# Patient Record
Sex: FEMALE | Race: WHITE | Employment: UNEMPLOYED | URBAN - METROPOLITAN AREA
[De-identification: names, ages, dates, MRNs, and addresses within clinical notes are randomized per-mention and may not be internally consistent; named-entity substitution may affect disease eponyms.]

---
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
## 2023-10-23 ENCOUNTER — HOSPITAL ENCOUNTER (EMERGENCY)
Facility: HOSPITAL | Age: 23
Discharge: HOME | End: 2023-10-24
Attending: FAMILY MEDICINE
Payer: MEDICAID

## 2023-10-23 ENCOUNTER — APPOINTMENT (OUTPATIENT)
Dept: RADIOLOGY | Facility: HOSPITAL | Age: 23
End: 2023-10-23
Payer: MEDICAID

## 2023-10-23 DIAGNOSIS — R06.02 SHORTNESS OF BREATH: ICD-10-CM

## 2023-10-23 DIAGNOSIS — R07.9 CHEST PAIN, UNSPECIFIED TYPE: Primary | ICD-10-CM

## 2023-10-23 DIAGNOSIS — B34.9 VIRAL INFECTION: ICD-10-CM

## 2023-10-23 DIAGNOSIS — R79.89 ELEVATED D-DIMER: ICD-10-CM

## 2023-10-23 DIAGNOSIS — R00.0 TACHYCARDIA: ICD-10-CM

## 2023-10-23 LAB
AMPHETAMINES UR QL SCN: NORMAL
BARBITURATES UR QL SCN: NORMAL
BASOPHILS # BLD AUTO: 0.02 X10*3/UL (ref 0–0.1)
BASOPHILS NFR BLD AUTO: 0.2 %
BENZODIAZ UR QL SCN: NORMAL
BZE UR QL SCN: NORMAL
CANNABINOIDS UR QL SCN: NORMAL
CARDIAC TROPONIN I PNL SERPL HS: <3 NG/L (ref 0–13)
D DIMER PPP FEU-MCNC: 700 NG/ML FEU
EOSINOPHIL # BLD AUTO: 0.12 X10*3/UL (ref 0–0.7)
EOSINOPHIL NFR BLD AUTO: 1.4 %
ERYTHROCYTE [DISTWIDTH] IN BLOOD BY AUTOMATED COUNT: 12.6 % (ref 11.5–14.5)
FENTANYL+NORFENTANYL UR QL SCN: NORMAL
HCG UR QL IA.RAPID: NEGATIVE
HCT VFR BLD AUTO: 42.2 % (ref 36–46)
HGB BLD-MCNC: 14.3 G/DL (ref 12–16)
IMM GRANULOCYTES # BLD AUTO: 0.03 X10*3/UL (ref 0–0.7)
IMM GRANULOCYTES NFR BLD AUTO: 0.3 % (ref 0–0.9)
LACTATE SERPL-SCNC: 1.2 MMOL/L (ref 0.4–2)
LYMPHOCYTES # BLD AUTO: 2.81 X10*3/UL (ref 1.2–4.8)
LYMPHOCYTES NFR BLD AUTO: 31.9 %
MAGNESIUM SERPL-MCNC: 1.63 MG/DL (ref 1.6–2.4)
MCH RBC QN AUTO: 28.6 PG (ref 26–34)
MCHC RBC AUTO-ENTMCNC: 33.9 G/DL (ref 32–36)
MCV RBC AUTO: 84 FL (ref 80–100)
MONOCYTES # BLD AUTO: 0.62 X10*3/UL (ref 0.1–1)
MONOCYTES NFR BLD AUTO: 7 %
NEUTROPHILS # BLD AUTO: 5.2 X10*3/UL (ref 1.2–7.7)
NEUTROPHILS NFR BLD AUTO: 59.2 %
NRBC BLD-RTO: 0 /100 WBCS (ref 0–0)
OPIATES UR QL SCN: NORMAL
OXYCODONE+OXYMORPHONE UR QL SCN: NORMAL
PCP UR QL SCN: NORMAL
PLATELET # BLD AUTO: 253 X10*3/UL (ref 150–450)
PMV BLD AUTO: 10 FL (ref 7.5–11.5)
RBC # BLD AUTO: 5 X10*6/UL (ref 4–5.2)
WBC # BLD AUTO: 8.8 X10*3/UL (ref 4.4–11.3)

## 2023-10-23 PROCEDURE — 83735 ASSAY OF MAGNESIUM: CPT | Performed by: FAMILY MEDICINE

## 2023-10-23 PROCEDURE — 74177 CT ABD & PELVIS W/CONTRAST: CPT | Mod: FOREIGN READ | Performed by: RADIOLOGY

## 2023-10-23 PROCEDURE — 71046 X-RAY EXAM CHEST 2 VIEWS: CPT | Mod: FOREIGN READ | Performed by: RADIOLOGY

## 2023-10-23 PROCEDURE — 71275 CT ANGIOGRAPHY CHEST: CPT | Mod: FOREIGN READ | Performed by: RADIOLOGY

## 2023-10-23 PROCEDURE — 85025 COMPLETE CBC W/AUTO DIFF WBC: CPT | Performed by: FAMILY MEDICINE

## 2023-10-23 PROCEDURE — 71046 X-RAY EXAM CHEST 2 VIEWS: CPT | Mod: FR

## 2023-10-23 PROCEDURE — 96360 HYDRATION IV INFUSION INIT: CPT | Mod: XU

## 2023-10-23 PROCEDURE — 80307 DRUG TEST PRSMV CHEM ANLYZR: CPT | Performed by: FAMILY MEDICINE

## 2023-10-23 PROCEDURE — 84484 ASSAY OF TROPONIN QUANT: CPT | Mod: 91 | Performed by: FAMILY MEDICINE

## 2023-10-23 PROCEDURE — 2550000001 HC RX 255 CONTRASTS: Mod: SE | Performed by: FAMILY MEDICINE

## 2023-10-23 PROCEDURE — 80048 BASIC METABOLIC PNL TOTAL CA: CPT | Performed by: FAMILY MEDICINE

## 2023-10-23 PROCEDURE — 71275 CT ANGIOGRAPHY CHEST: CPT | Mod: FR

## 2023-10-23 PROCEDURE — 85379 FIBRIN DEGRADATION QUANT: CPT | Performed by: FAMILY MEDICINE

## 2023-10-23 PROCEDURE — 36415 COLL VENOUS BLD VENIPUNCTURE: CPT | Performed by: FAMILY MEDICINE

## 2023-10-23 PROCEDURE — 81025 URINE PREGNANCY TEST: CPT | Performed by: FAMILY MEDICINE

## 2023-10-23 PROCEDURE — 83605 ASSAY OF LACTIC ACID: CPT | Performed by: FAMILY MEDICINE

## 2023-10-23 PROCEDURE — 2500000004 HC RX 250 GENERAL PHARMACY W/ HCPCS (ALT 636 FOR OP/ED): Performed by: FAMILY MEDICINE

## 2023-10-23 PROCEDURE — 84484 ASSAY OF TROPONIN QUANT: CPT | Performed by: FAMILY MEDICINE

## 2023-10-23 PROCEDURE — 96361 HYDRATE IV INFUSION ADD-ON: CPT

## 2023-10-23 PROCEDURE — 99285 EMERGENCY DEPT VISIT HI MDM: CPT | Mod: 25

## 2023-10-23 PROCEDURE — 74177 CT ABD & PELVIS W/CONTRAST: CPT

## 2023-10-23 RX ADMIN — IOHEXOL 75 ML: 350 INJECTION, SOLUTION INTRAVENOUS at 22:14

## 2023-10-23 RX ADMIN — IOHEXOL 75 ML: 350 INJECTION, SOLUTION INTRAVENOUS at 22:12

## 2023-10-23 RX ADMIN — IOHEXOL 75 ML: 350 INJECTION, SOLUTION INTRAVENOUS at 22:24

## 2023-10-23 RX ADMIN — SODIUM CHLORIDE 1000 ML: 9 INJECTION, SOLUTION INTRAVENOUS at 19:52

## 2023-10-23 RX ADMIN — SODIUM CHLORIDE 1000 ML: 9 INJECTION, SOLUTION INTRAVENOUS at 21:40

## 2023-10-23 ASSESSMENT — COLUMBIA-SUICIDE SEVERITY RATING SCALE - C-SSRS
6. HAVE YOU EVER DONE ANYTHING, STARTED TO DO ANYTHING, OR PREPARED TO DO ANYTHING TO END YOUR LIFE?: NO
1. IN THE PAST MONTH, HAVE YOU WISHED YOU WERE DEAD OR WISHED YOU COULD GO TO SLEEP AND NOT WAKE UP?: NO
2. HAVE YOU ACTUALLY HAD ANY THOUGHTS OF KILLING YOURSELF?: NO

## 2023-10-23 ASSESSMENT — PAIN SCALES - GENERAL
PAINLEVEL_OUTOF10: 4
PAINLEVEL_OUTOF10: 1

## 2023-10-23 ASSESSMENT — PAIN DESCRIPTION - LOCATION: LOCATION: CHEST

## 2023-10-23 ASSESSMENT — PAIN DESCRIPTION - PROGRESSION: CLINICAL_PROGRESSION: GRADUALLY IMPROVING

## 2023-10-23 ASSESSMENT — PAIN DESCRIPTION - DESCRIPTORS: DESCRIPTORS: HEAVINESS

## 2023-10-23 ASSESSMENT — PAIN DESCRIPTION - PAIN TYPE: TYPE: ACUTE PAIN

## 2023-10-23 ASSESSMENT — PAIN - FUNCTIONAL ASSESSMENT: PAIN_FUNCTIONAL_ASSESSMENT: 0-10

## 2023-10-23 NOTE — LETTER
October 24, 2023    Patient: Chaka Beltran   YOB: 2000   Date of Visit: 10/23/2023       To Whom It May Concern:    Chaka Beltran was seen and treated in our emergency department on 10/23/2023 into 10/23/2023. She can return to work on Wednesday 10/25/223.     If you have any questions or concerns, please don't hesitate to call.       Hyacinth GUPTA RN

## 2023-10-23 NOTE — ED PROVIDER NOTES
HPI   No chief complaint on file.      HPI  22-year-old female patient brought into the emergency room complaining of chest pain and tachycardia the patient showed me a monitor from his response picture showing heart rate of 134   REVIEW OF SYSTEMS    CONSTITUTIONAL: Negative for: chills and fever  EYES: Negative for: pain   ENMTEars: Negative for: hearing disturbance and tinnitus  Nose: Negative for: congestion and nose bleeds  CARDIOVASCULAR: POSITIVE for: chest pain and tachycardia, fatigue and weakness with some shortness of breath.  Negative for: bradycardia, edema, irregular rhythm   RESPIRATORY: POSITIVE forSome shortness of breath without persistent dyspnea Negative for: cough, pleuritic chest pain and wheezing  GASTROINTESTINAL: POSITIVE for: nausea; Negative for: abdominal pain and vomiting; melena patient however admitted some diarrhea during last week but denies any persistent diarrhea.      GENITOURINARY: Negative for: dysuria, frequency, hematuria and strong smelling urine; MUSCULOSKELETAL: Negative for: back pain, neck pain, sensory deficits and stiffness  NEUROLOGICAL: Negative for: altered mental status, dizziness, headache, loss of consciousness, loss of function and low extremity numbness; memory impairment, neck stiffness, sensory deficits, upper extremity numbness and vertigo  PSYCHIATRIC: Negative for: anxiety, depression and hallucinations    HEME/LYMPH: Negative for: anemia and night sweats      PHYSICAL EXAM    CONSTITUTIONAL: Patient is awake alert pleasant and cooperative did not look toxic in distress.. Breathing comfortably oriented times three. HENMT: The airway was intact. There was no ear or nose discharge. No drooling or stridor. Neck was supple. Talking and breathing comfortably.  EYES: Clear bilaterally, pupils equal, round and reactive to light. CARDIOVASCULAR: Regular rate and rhythm. No friction rub or murmur good peripheral pulses no peripheral edema. Nontender Homans sign  negative. RESPIRATORY: Patient was breathing comfortably. No tachypnea no respiratory distress.  On auscultation he has diminished breath sounds crackles in the lung bases.  However has good skin perfusion. GASTROINTESTINAL: Abdomen soft and non-distended, no palpable mass noted, nontender no guarding rebound or rigidity no CVA tenderness noted no organomegaly. Careful examination demonstrated no GENITOURINARY:  No costovertebral angle tenderness. MUSCULOSKELETAL: Head was normocephalic atraumatic cervical thoracic lumbar spine nontender.  Chest was nontender  Both upper extremity good motion nontender intact distal pulse intact sensation. NEUROLOGICAL: Awake alert pleasant and cooperative. No motor or sensory deficit no arms selective noted. Intact neurovascular function and motor function. No facial drooping or drooling. Talking and breathing comfortably.  Cranial nerves II to XII grossly intact. No arms selective noted. No nystagmus.  SKIN: Skin normal color for race, warm, dry and intact. No evidence of trauma or lesions. PSYCHIATRIC: Awake alert and without acute distress. No obvious depression, no suicidal thoughts or ideation. Appropriate mood. Talking and normal tone. HEME/LYMPH: No adenopathy or splenomegaly.     She also admits to short of breath.  She feels tired and rundown.  Denies any fever arms or cold sweats any pain swelling legs or UTI symptoms.  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  Denies vaginal discharge or bleed.  Denies any vomiting blood or blood in stool but she admits some diarrhea recently.  She denies UTI symptoms.    Review of system: 10 review of system obtained they were all negative except as described in HPI.                No data recorded                Patient History   No past medical history on file.  No past surgical history on file.  No family history on file.  Social History     Tobacco Use   • Smoking status: Not  on file   • Smokeless tobacco: Not on file   Substance Use Topics   • Alcohol use: Not on file   • Drug use: Not on file       Physical Exam   ED Triage Vitals   Temp Pulse Resp BP   -- -- -- --      SpO2 Temp src Heart Rate Source Patient Position   -- -- -- --      BP Location FiO2 (%)     -- --       Physical Exam    ED Course & MDM   Diagnoses as of 10/23/23 9384   Chest pain, unspecified type   Tachycardia   Shortness of breath   Elevated d-dimer   Viral infection     EKG obtained at 1901 hrs sinus tachycardia rate of 125.  Artifact limiting interpretation.  No acute evaluation.  No STEMI.  Tachycardia otherwise unremarkable EKG.  Medical Decision Making    Your CT of the chest showed no evidence of PE CT abdomen pelvis also showed no acute abnormality however final report from radiologist pending.  Your blood test for heart attack was negative.  He had responded remarkably well to the IV hydration.  You most likely a viral infection.  I added a monotest on your labs and also COVID-19 has been added please call back for these report.  Rest drink plenty of fluids Tylenol for pain and ache.  Follow-up with cardiology clinic for outpatient echocardiogram.  If any problem or concern return to ER.  You have been referred to Dr. Chirinos for follow-up and primary care physician for follow-up    Laboratory findings physical examination findings and assessment and treatment plan as well as follow-up plan discussed with the patient all questions answered the patient was allowed to ask questions and answered to the best of capability and information available.  Patient is aware she needs to follow-up evaluation.  If any worsening symptoms or new symptoms return to ER.    Condition of discharge stable.  Procedure  Procedures     Bhavna Hart MD  10/23/23 [id number]

## 2023-10-23 NOTE — Clinical Note
Your CT of the chest showed no evidence of PE CT abdomen pelvis also showed no acute abnormality however final report from radiologist pending.  Your blood test for heart attack was negative.  He had responded remarkably well to the IV hydration.  Yo u most likely a viral infection.  I added a monotest on your labs and also COVID-19 has been added please call back for these report.  Rest drink plenty of fluids Tylenol for pain and ache.  Follow-up with cardiology clinic for outpatient echocardiog flora.  If any problem or concern return to ER.  You have been referred to Dr. Chirinos for follow-up and primary care physician for follow-up

## 2023-10-24 VITALS
RESPIRATION RATE: 18 BRPM | OXYGEN SATURATION: 100 % | SYSTOLIC BLOOD PRESSURE: 122 MMHG | HEIGHT: 67 IN | HEART RATE: 107 BPM | BODY MASS INDEX: 42.38 KG/M2 | WEIGHT: 270 LBS | DIASTOLIC BLOOD PRESSURE: 82 MMHG | TEMPERATURE: 97.9 F

## 2023-10-24 LAB
ANION GAP SERPL CALC-SCNC: 14 MMOL/L (ref 10–20)
BUN SERPL-MCNC: 7 MG/DL (ref 6–23)
CALCIUM SERPL-MCNC: 9.4 MG/DL (ref 8.6–10.3)
CHLORIDE SERPL-SCNC: 106 MMOL/L (ref 98–107)
CO2 SERPL-SCNC: 22 MMOL/L (ref 21–32)
CREAT SERPL-MCNC: 0.79 MG/DL (ref 0.5–1.05)
GFR SERPL CREATININE-BSD FRML MDRD: >90 ML/MIN/1.73M*2
GLUCOSE SERPL-MCNC: 86 MG/DL (ref 74–99)
HETEROPH AB SERPLBLD QL IA.RAPID: NEGATIVE
POTASSIUM SERPL-SCNC: 3.7 MMOL/L (ref 3.5–5.3)
SARS-COV-2 RNA RESP QL NAA+PROBE: NOT DETECTED
SODIUM SERPL-SCNC: 138 MMOL/L (ref 136–145)

## 2023-10-24 PROCEDURE — 36415 COLL VENOUS BLD VENIPUNCTURE: CPT | Performed by: FAMILY MEDICINE

## 2023-10-24 PROCEDURE — 86308 HETEROPHILE ANTIBODY SCREEN: CPT | Performed by: FAMILY MEDICINE

## 2023-10-24 PROCEDURE — 87635 SARS-COV-2 COVID-19 AMP PRB: CPT | Performed by: FAMILY MEDICINE

## 2023-10-24 NOTE — DISCHARGE INSTRUCTIONS
Your CT of the chest showed no evidence of PE CT abdomen pelvis also showed no acute abnormality however final report from radiologist pending.  Your blood test for heart attack was negative.  He had responded remarkably well to the IV hydration.  You most likely a viral infection.  I added a monotest on your labs and also COVID-19 has been added please call back for these report.  Rest drink plenty of fluids Tylenol for pain and ache.  Follow-up with cardiology clinic for outpatient echocardiogram.  If any problem or concern return to ER.  You have been referred to Dr. Chirinos for follow-up and primary care physician for follow-up

## 2023-10-26 ENCOUNTER — HOSPITAL ENCOUNTER (EMERGENCY)
Facility: HOSPITAL | Age: 23
Discharge: LEFT BEFORE DISCHARGE | End: 2023-10-26
Payer: MEDICAID

## 2023-10-26 VITALS
HEART RATE: 108 BPM | DIASTOLIC BLOOD PRESSURE: 81 MMHG | SYSTOLIC BLOOD PRESSURE: 126 MMHG | HEIGHT: 67 IN | WEIGHT: 220 LBS | RESPIRATION RATE: 14 BRPM | BODY MASS INDEX: 34.53 KG/M2 | OXYGEN SATURATION: 97 % | TEMPERATURE: 98.1 F

## 2023-10-26 PROCEDURE — 99281 EMR DPT VST MAYX REQ PHY/QHP: CPT | Mod: 25

## 2023-10-26 ASSESSMENT — COLUMBIA-SUICIDE SEVERITY RATING SCALE - C-SSRS
6. HAVE YOU EVER DONE ANYTHING, STARTED TO DO ANYTHING, OR PREPARED TO DO ANYTHING TO END YOUR LIFE?: NO
2. HAVE YOU ACTUALLY HAD ANY THOUGHTS OF KILLING YOURSELF?: NO
1. IN THE PAST MONTH, HAVE YOU WISHED YOU WERE DEAD OR WISHED YOU COULD GO TO SLEEP AND NOT WAKE UP?: NO

## 2023-10-26 ASSESSMENT — PAIN DESCRIPTION - DESCRIPTORS: DESCRIPTORS: ACHING

## 2023-10-26 ASSESSMENT — PAIN DESCRIPTION - FREQUENCY: FREQUENCY: INTERMITTENT

## 2023-10-26 ASSESSMENT — PAIN DESCRIPTION - LOCATION: LOCATION: CHEST

## 2023-10-26 ASSESSMENT — PAIN DESCRIPTION - PAIN TYPE: TYPE: ACUTE PAIN

## 2023-10-26 ASSESSMENT — PAIN SCALES - GENERAL: PAINLEVEL_OUTOF10: 5 - MODERATE PAIN

## 2023-10-26 ASSESSMENT — PAIN - FUNCTIONAL ASSESSMENT: PAIN_FUNCTIONAL_ASSESSMENT: 0-10

## 2023-10-26 NOTE — ED NOTES
Heart rate goes up to 120-130 on her apple watch EKG completed ST at 110 Dr Duarte reviewed      Talita Rojas, RN  10/26/23 4300

## 2023-10-28 ENCOUNTER — HOSPITAL ENCOUNTER (EMERGENCY)
Facility: HOSPITAL | Age: 23
Discharge: HOME | End: 2023-10-28
Payer: MEDICAID

## 2023-10-28 VITALS
HEART RATE: 93 BPM | SYSTOLIC BLOOD PRESSURE: 136 MMHG | BODY MASS INDEX: 37.67 KG/M2 | DIASTOLIC BLOOD PRESSURE: 82 MMHG | HEIGHT: 67 IN | RESPIRATION RATE: 18 BRPM | WEIGHT: 240 LBS | OXYGEN SATURATION: 98 % | TEMPERATURE: 96.7 F

## 2023-10-28 DIAGNOSIS — K08.89 TOOTH ACHE: ICD-10-CM

## 2023-10-28 DIAGNOSIS — K04.7 DENTAL INFECTION: Primary | ICD-10-CM

## 2023-10-28 PROCEDURE — 2500000004 HC RX 250 GENERAL PHARMACY W/ HCPCS (ALT 636 FOR OP/ED): Mod: SE | Performed by: EMERGENCY MEDICINE

## 2023-10-28 PROCEDURE — 99283 EMERGENCY DEPT VISIT LOW MDM: CPT

## 2023-10-28 PROCEDURE — 96372 THER/PROPH/DIAG INJ SC/IM: CPT

## 2023-10-28 PROCEDURE — 2500000001 HC RX 250 WO HCPCS SELF ADMINISTERED DRUGS (ALT 637 FOR MEDICARE OP): Mod: SE

## 2023-10-28 PROCEDURE — 99284 EMERGENCY DEPT VISIT MOD MDM: CPT

## 2023-10-28 RX ORDER — AMOXICILLIN 875 MG/1
875 TABLET, FILM COATED ORAL 2 TIMES DAILY
Qty: 20 TABLET | Refills: 0 | Status: SHIPPED | OUTPATIENT
Start: 2023-10-28 | End: 2023-11-07

## 2023-10-28 RX ORDER — AMOXICILLIN 500 MG/1
1000 CAPSULE ORAL EVERY 8 HOURS SCHEDULED
Status: DISCONTINUED | OUTPATIENT
Start: 2023-10-28 | End: 2023-10-28 | Stop reason: HOSPADM

## 2023-10-28 RX ORDER — AMOXICILLIN 500 MG/1
CAPSULE ORAL
Status: COMPLETED
Start: 2023-10-28 | End: 2023-10-28

## 2023-10-28 RX ORDER — KETOROLAC TROMETHAMINE 30 MG/ML
30 INJECTION, SOLUTION INTRAMUSCULAR; INTRAVENOUS ONCE
Status: COMPLETED | OUTPATIENT
Start: 2023-10-28 | End: 2023-10-28

## 2023-10-28 RX ORDER — IBUPROFEN 600 MG/1
600 TABLET ORAL EVERY 6 HOURS PRN
Qty: 40 TABLET | Refills: 0 | Status: SHIPPED | OUTPATIENT
Start: 2023-10-28 | End: 2023-11-07

## 2023-10-28 RX ADMIN — AMOXICILLIN 1000 MG: 500 CAPSULE ORAL at 11:03

## 2023-10-28 RX ADMIN — KETOROLAC TROMETHAMINE 30 MG: 30 INJECTION, SOLUTION INTRAMUSCULAR; INTRAVENOUS at 11:04

## 2023-10-28 ASSESSMENT — ENCOUNTER SYMPTOMS
EYE PAIN: 0
ABDOMINAL PAIN: 0
VOMITING: 0
PALPITATIONS: 0
FEVER: 0
CHILLS: 0
ARTHRALGIAS: 0
COUGH: 0
BACK PAIN: 0
SHORTNESS OF BREATH: 0
HEMATURIA: 0
SEIZURES: 0
DYSURIA: 0
COLOR CHANGE: 0
SORE THROAT: 0

## 2023-10-28 ASSESSMENT — PAIN - FUNCTIONAL ASSESSMENT: PAIN_FUNCTIONAL_ASSESSMENT: 0-10

## 2023-10-28 ASSESSMENT — COLUMBIA-SUICIDE SEVERITY RATING SCALE - C-SSRS
2. HAVE YOU ACTUALLY HAD ANY THOUGHTS OF KILLING YOURSELF?: NO
6. HAVE YOU EVER DONE ANYTHING, STARTED TO DO ANYTHING, OR PREPARED TO DO ANYTHING TO END YOUR LIFE?: NO
1. IN THE PAST MONTH, HAVE YOU WISHED YOU WERE DEAD OR WISHED YOU COULD GO TO SLEEP AND NOT WAKE UP?: NO

## 2023-10-28 ASSESSMENT — PAIN DESCRIPTION - LOCATION: LOCATION: TEETH

## 2023-10-28 ASSESSMENT — PAIN DESCRIPTION - ORIENTATION: ORIENTATION: LEFT

## 2023-10-28 ASSESSMENT — PAIN SCALES - GENERAL: PAINLEVEL_OUTOF10: 8

## 2023-10-28 NOTE — ED PROVIDER NOTES
Department of Emergency Medicine   ED  Provider Note  Admit Date/RoomTime: 10/28/2023  9:57 AM  ED Room: DECON/DECON                  History of Present Illness:   Chaka Beltran is a 22 y.o. female presenting to the ED for dental pain, beginning a few days ago.  The complaint has been constant, moderate in severity, and worsened by nothing.  Patient reports ankle pain left upper side in particular in the last couple of teeth in the left upper side.  She been unable to see a dentist because she does not have insurance.  She denies any fever or chills.  She complains of pain on chewing on that side.  No fever chills no sore throat or cough.  No neck pain.  No nausea vomiting diarrhea.  No acute voice change.  No difficulty swallowing.      Review of Systems:   Pertinent positives and review of systems as noted above.  Remaining 10 review of systems is negative or noncontributory to today's episode of care.  Review of Systems   Constitutional:  Negative for chills and fever.   HENT:  Negative for ear pain and sore throat.    Eyes:  Negative for pain and visual disturbance.   Respiratory:  Negative for cough and shortness of breath.    Cardiovascular:  Negative for chest pain and palpitations.   Gastrointestinal:  Negative for abdominal pain and vomiting.   Genitourinary:  Negative for dysuria and hematuria.   Musculoskeletal:  Negative for arthralgias and back pain.   Skin:  Negative for color change and rash.   Neurological:  Negative for seizures and syncope.   All other systems reviewed and are negative.         --------------------------------------------- PAST HISTORY ---------------------------------------------  Past Medical History:  has no past medical history on file.  Denies chronic illness.  Denies prescribed medications.    Past Surgical History:  has no past surgical history on file.  No recent surgeries.    Social History:  Social history is positive for tobacco use she denies alcohol use or recreational  "drug use.    Family History: family history is not on file. Unless otherwise noted, family history is non contributory    Patient's Medications    No medications on file      The patient’s home medications have been reviewed.    Allergies: Metoclopramide, Ondansetron hcl, and Reglan [metoclopramide hcl]    -------------------------------------------------- RESULTS -------------------------------------------------  All laboratory and radiology results have been personally reviewed by myself   LABS:  Labs Reviewed - No data to display      RADIOLOGY:  Interpreted by Radiologist.  No orders to display       No results found for this or any previous visit (from the past 4464 hour(s)).  ------------------------- NURSING NOTES AND VITALS REVIEWED ---------------------------   The nursing notes within the ED encounter and vital signs as below have been reviewed.   /82   Pulse 93   Temp 35.9 °C (96.7 °F) (Temporal)   Resp 18   Ht 1.702 m (5' 7\")   Wt 109 kg (240 lb)   SpO2 98%   BMI 37.59 kg/m²   Oxygen Saturation Interpretation: Normal      ---------------------------------------------------PHYSICAL EXAM--------------------------------------  Physical Exam  Vitals and nursing note reviewed.   Constitutional:       General: She is not in acute distress.     Appearance: Normal appearance. She is well-developed.   HENT:      Head: Normocephalic and atraumatic.      Right Ear: Tympanic membrane and ear canal normal.      Left Ear: Tympanic membrane and ear canal normal.      Nose: Nose normal.      Mouth/Throat:      Mouth: Mucous membranes are moist.      Pharynx: Oropharynx is clear.      Comments: Appears to have dental caries in the left upper posterior 2 molars.  No significant gum erythema or swelling.  Tongue and uvula are midline.  Phonation is normal.  There is no drooling or trismus.  Airway is intact.  Eyes:      Conjunctiva/sclera: Conjunctivae normal.   Cardiovascular:      Rate and Rhythm: Normal " rate and regular rhythm.      Heart sounds: No murmur heard.  Pulmonary:      Effort: Pulmonary effort is normal. No respiratory distress.      Breath sounds: Normal breath sounds.   Abdominal:      Palpations: Abdomen is soft.      Tenderness: There is no abdominal tenderness.   Musculoskeletal:         General: No swelling.      Cervical back: Neck supple.   Skin:     General: Skin is warm and dry.      Capillary Refill: Capillary refill takes less than 2 seconds.   Neurological:      Mental Status: She is alert.   Psychiatric:         Mood and Affect: Mood normal.            Procedures  None  ------------------------------ ED COURSE/MEDICAL DECISION MAKING----------------------    Medical Decision Making:   Patient was seen and examined by me.  Patient was started on amoxicillin 875 mg twice daily.  Patient was given a shot of Toradol 30 mg IM x1 here for pain control.    Rx ibuprofen 600 mg 4 times daily as needed pain  Rx amoxicillin 875 mg twice daily x10 days  Patient is encouraged to follow-up with dental provider of choice.    Diagnoses as of 10/28/23 1034   Dental infection   Tooth ache      Counseling:   The emergency provider has spoken with the patient and discussed today’s results, in addition to providing specific details for the plan of care and counseling regarding the diagnosis and prognosis.  Questions are answered at this time and they are agreeable with the plan.      --------------------------------- IMPRESSION AND DISPOSITION ---------------------------------        IMPRESSION  No diagnosis found.    DISPOSITION  Disposition: Discharge to home  Patient condition is good      Billing Provider Critical Care Time: 0 minutes     Dion Aguilar DO  10/28/23 1037

## 2023-10-29 PROBLEM — F07.81 POST CONCUSSION SYNDROME: Status: ACTIVE | Noted: 2017-12-05

## 2023-10-29 PROBLEM — G47.9 SLEEP DISTURBANCE: Status: ACTIVE | Noted: 2017-12-05

## 2023-10-29 RX ORDER — ALBUTEROL SULFATE 90 UG/1
AEROSOL, METERED RESPIRATORY (INHALATION)
COMMUNITY
Start: 2022-05-11

## 2023-10-29 RX ORDER — ETONOGESTREL AND ETHINYL ESTRADIOL .12; .015 MG/D; MG/D
1 RING VAGINAL
COMMUNITY

## 2023-10-29 RX ORDER — METRONIDAZOLE 500 MG/1
500 TABLET ORAL 2 TIMES DAILY
COMMUNITY
End: 2023-10-31 | Stop reason: ALTCHOICE

## 2023-10-29 RX ORDER — FLUCONAZOLE 150 MG/1
150 TABLET ORAL DAILY
COMMUNITY
Start: 2023-07-24 | End: 2024-01-05 | Stop reason: ALTCHOICE

## 2023-10-29 RX ORDER — SALINE NASAL SPRAY 1.5 OZ
1 SOLUTION NASAL AS NEEDED
COMMUNITY
Start: 2023-01-17

## 2023-10-29 RX ORDER — ONDANSETRON 4 MG/1
4 TABLET, FILM COATED ORAL EVERY 8 HOURS PRN
COMMUNITY
Start: 2022-11-30 | End: 2023-10-31 | Stop reason: ALTCHOICE

## 2023-10-29 RX ORDER — PREDNISONE 20 MG/1
20 TABLET ORAL 2 TIMES DAILY
COMMUNITY
Start: 2023-07-12 | End: 2023-10-31 | Stop reason: ALTCHOICE

## 2023-10-29 RX ORDER — TRIAMCINOLONE ACETONIDE 1 MG/G
CREAM TOPICAL
COMMUNITY
Start: 2023-07-19 | End: 2023-10-31 | Stop reason: ALTCHOICE

## 2023-10-29 RX ORDER — FLUTICASONE PROPIONATE 50 MCG
2 SPRAY, SUSPENSION (ML) NASAL DAILY PRN
COMMUNITY
Start: 2021-10-21

## 2023-10-29 RX ORDER — ACYCLOVIR 400 MG/1
TABLET ORAL
COMMUNITY
Start: 2023-02-02

## 2023-10-31 ENCOUNTER — OFFICE VISIT (OUTPATIENT)
Dept: CARDIOLOGY | Facility: CLINIC | Age: 23
End: 2023-10-31
Payer: MEDICAID

## 2023-10-31 VITALS
WEIGHT: 244.5 LBS | DIASTOLIC BLOOD PRESSURE: 99 MMHG | HEART RATE: 113 BPM | BODY MASS INDEX: 38.29 KG/M2 | SYSTOLIC BLOOD PRESSURE: 141 MMHG | OXYGEN SATURATION: 97 %

## 2023-10-31 DIAGNOSIS — I10 PRIMARY HYPERTENSION: Primary | ICD-10-CM

## 2023-10-31 DIAGNOSIS — R00.0 TACHYCARDIA: ICD-10-CM

## 2023-10-31 DIAGNOSIS — I47.10 PAROXYSMAL SUPRAVENTRICULAR TACHYCARDIA (CMS-HCC): ICD-10-CM

## 2023-10-31 PROCEDURE — 3080F DIAST BP >= 90 MM HG: CPT | Performed by: NURSE PRACTITIONER

## 2023-10-31 PROCEDURE — 3077F SYST BP >= 140 MM HG: CPT | Performed by: NURSE PRACTITIONER

## 2023-10-31 PROCEDURE — 99204 OFFICE O/P NEW MOD 45 MIN: CPT | Performed by: NURSE PRACTITIONER

## 2023-10-31 RX ORDER — METOPROLOL SUCCINATE 25 MG/1
25 TABLET, EXTENDED RELEASE ORAL DAILY
Qty: 30 TABLET | Refills: 11 | Status: SHIPPED | OUTPATIENT
Start: 2023-10-31 | End: 2023-12-15 | Stop reason: SINTOL

## 2023-10-31 RX ORDER — NEBULIZER AND COMPRESSOR
1 EACH MISCELLANEOUS DAILY
Qty: 1 EACH | Refills: 0 | Status: SHIPPED | OUTPATIENT
Start: 2023-10-31

## 2023-10-31 NOTE — PROGRESS NOTES
Primary Care Physician: Shayne Salazar DO  Primary Cardiologist:      Date of Visit: 10/31/2023  9:20 AM EDT  Location of visit:  [facility]   Type of Visit: New Patient        Chief Complaint   Patient presents with    New Patient Visit     Tachycardia, feels foggy, fatigued, chest pains x 8 days.       HPI / Summary:   Chaka Beltran is a 22 y.o. female who presents to establish cardiac care         while at work associated with fatigue and diaphoresis, shortness of breath and discomfort in the chest,  lasted ~1 hour, now sometimes all day   ECG in the ER was sinus tachycardia.  She was suspected to have a viral infection.  She vapes nicotine         12 system review is negative except as noted above           Medical History:   History reviewed. No pertinent past medical history.    Surgical History:   Past Surgical History:   Procedure Laterality Date    WISDOM TOOTH EXTRACTION         Family History:   Family History   Problem Relation Name Age of Onset    Edema Mother      Edema Father      Heart failure Paternal Grandmother         Social History:   Tobacco Use: High Risk (10/31/2023)    Patient History     Smoking Tobacco Use: Every Day     Smokeless Tobacco Use: Current     Passive Exposure: Not on file             MEDICATIONS:   Current Outpatient Medications   Medication Instructions    acyclovir (Zovirax) 400 mg tablet     albuterol 90 mcg/actuation inhaler Ventolin HFA 90 mcg/actuation aerosol inhaler    amoxicillin (AMOXIL) 875 mg, oral, 2 times daily    Deep Sea Nasal 0.65 % nasal spray 1 spray, nasal, As needed    EluRyng 0.12-0.015 mg/24 hr vaginal ring 1 each, vaginal, FOR 3 WEEKS THEN 1 WEEK OFF    fluconazole (DIFLUCAN) 150 mg, oral, Daily    fluticasone (Flonase) 50 mcg/actuation nasal spray 2 sprays, Each Nostril, Daily PRN, SHAKE LIQUID AND USE    ibuprofen 600 mg, oral, Every 6 hours PRN    metoprolol succinate XL (TOPROL-XL) 25 mg, oral, Daily, Do not crush or chew.     "miscellaneous medical supply (Blood Pressure Cuff) misc 1 kit, miscellaneous, Daily         LABS:  CBC:   Lab Results   Component Value Date    WBC 8.8 10/23/2023    RBC 5.00 10/23/2023    HGB 14.3 10/23/2023    HCT 42.2 10/23/2023    MCV 84 10/23/2023    MCH 28.6 10/23/2023    MCHC 33.9 10/23/2023    RDW 12.6 10/23/2023     10/23/2023    MPV 10.0 10/23/2023     CBC with Differential:    Lab Results   Component Value Date    WBC 8.8 10/23/2023    RBC 5.00 10/23/2023    HGB 14.3 10/23/2023    HCT 42.2 10/23/2023     10/23/2023    MCV 84 10/23/2023    MCH 28.6 10/23/2023    MCHC 33.9 10/23/2023    RDW 12.6 10/23/2023    NRBC 0.0 10/23/2023    LYMPHOPCT 31.9 10/23/2023    MONOPCT 7.0 10/23/2023    EOSPCT 1.4 10/23/2023    BASOPCT 0.2 10/23/2023    MONOSABS 0.62 10/23/2023    LYMPHSABS 2.81 10/23/2023    EOSABS 0.12 10/23/2023    BASOSABS 0.02 10/23/2023     CMP:    Lab Results   Component Value Date     10/23/2023    K 3.7 10/23/2023     10/23/2023    CO2 22 10/23/2023    BUN 7 10/23/2023    CREATININE 0.79 10/23/2023    GLUCOSE 86 10/23/2023    CALCIUM 9.4 10/23/2023     BMP:    Lab Results   Component Value Date     10/23/2023    K 3.7 10/23/2023     10/23/2023    CO2 22 10/23/2023    BUN 7 10/23/2023    CREATININE 0.79 10/23/2023    CALCIUM 9.4 10/23/2023    GLUCOSE 86 10/23/2023     Magnesium:  Lab Results   Component Value Date    MG 1.63 10/23/2023     Troponin:    Lab Results   Component Value Date    TROPHS <3 10/23/2023    TROPHS <3 10/23/2023    TROPHS <3 10/23/2023     BNP: No results found for: \"BNP\"    Lipid Panel:  No results found for: \"HDL\", \"CHHDL\", \"VLDL\", \"TRIG\", \"NHDL\"     Lab work and imaging results independently reviewed by me     EKG dated 10/26/2023  independently reviewed           ECG dated 10/23/2023 independently reviewed           Constitutional:       Appearance: Healthy appearance. Not in distress.   Eyes:      Conjunctiva/sclera: " Conjunctivae normal.   Neck:      Vascular: JVD normal.   Pulmonary:      Effort: Pulmonary effort is normal.      Breath sounds: Normal breath sounds.   Cardiovascular:      PMI at left midclavicular line. Normal rate. Regular rhythm. Normal S1. Normal S2.       Murmurs: There is no murmur.      No rub.   Pulses:     Intact distal pulses.   Edema:     Peripheral edema absent.   Abdominal:      General: Bowel sounds are normal.   Musculoskeletal:      Cervical back: Neck supple. Skin:     General: Skin is warm and dry.   Neurological:      Mental Status: Alert and oriented to person, place and time.           Problem List Items Addressed This Visit             ICD-10-CM    Tachycardia R00.0    Paroxysmal supraventricular tachycardia I47.10     Suspect pSVT given duration of high HR   Encouraged her to stop Vaping nicotine          Relevant Medications    metoprolol succinate XL (Toprol-XL) 25 mg 24 hr tablet    Other Relevant Orders    Transthoracic echo (TTE) complete    Holter or Event Cardiac Monitor    Primary hypertension - Primary I10    Relevant Medications    miscellaneous medical supply (Blood Pressure Cuff) misc    metoprolol succinate XL (Toprol-XL) 25 mg 24 hr tablet           MAXIMINO Ivey         Followup Appts:  Future Appointments   Date Time Provider Department Center   11/14/2023 11:00 AM [facility] [address]   11/14/2023 12:00 PM [facility] [address]   12/15/2023 10:20 AM MAXIMINO Ivey [id number] [place]

## 2023-10-31 NOTE — PATIENT INSTRUCTIONS
Home BP monitoring instructions:::: Goal < 130 / 80   Remain still, Avoid smoking, caffeinated beverages, or exercise within 30 min before BP measurements.  Ensure 5 min of quiet rest before BP measurements.  Sit correctly with back straight and supported (on a straight-backed dining chair, for example, rather than a sofa).  Sit with feet flat on the floor and legs uncrossed.  Keep arm supported on a flat surface (such as a table), with the upper arm at heart level.  Bottom of the cuff should be placed directly above the bend of the elbow  Take a reading in the AM before breakfast 2-3 times / week   Record all readings accurately:  A written log should be brought to all appointments   Monitors with built-in memory should be brought to all clinic appointments and calibrated to our machines

## 2023-11-14 ENCOUNTER — HOSPITAL ENCOUNTER (OUTPATIENT)
Dept: CARDIOLOGY | Facility: HOSPITAL | Age: 23
Discharge: HOME | End: 2023-11-14
Payer: MEDICAID

## 2023-11-14 DIAGNOSIS — I47.10 PAROXYSMAL SUPRAVENTRICULAR TACHYCARDIA (CMS-HCC): ICD-10-CM

## 2023-11-14 LAB
AORTIC VALVE PEAK VELOCITY: 1.28
AV PEAK GRADIENT: 6.6
AVA (PEAK VEL): 2.09
EJECTION FRACTION APICAL 4 CHAMBER: 65.6
EJECTION FRACTION: 65
LEFT ATRIUM VOLUME AREA LENGTH INDEX BSA: 8.7
LEFT VENTRICLE INTERNAL DIMENSION DIASTOLE: 3.76 (ref 3.5–6)
LEFT VENTRICULAR OUTFLOW TRACT DIAMETER: 1.8
MITRAL VALVE E/A RATIO: 1.4
MITRAL VALVE E/E' RATIO: 6.19
RIGHT VENTRICLE FREE WALL PEAK S': 13.8
TRICUSPID ANNULAR PLANE SYSTOLIC EXCURSION: 1.6

## 2023-11-14 PROCEDURE — 93306 TTE W/DOPPLER COMPLETE: CPT | Performed by: INTERNAL MEDICINE

## 2023-11-14 PROCEDURE — 93306 TTE W/DOPPLER COMPLETE: CPT

## 2023-11-14 PROCEDURE — 93246 EXT ECG>7D<15D RECORDING: CPT

## 2023-11-14 PROCEDURE — 93248 EXT ECG>7D<15D REV&INTERPJ: CPT | Performed by: INTERNAL MEDICINE

## 2023-11-14 PROCEDURE — 2500000004 HC RX 250 GENERAL PHARMACY W/ HCPCS (ALT 636 FOR OP/ED): Mod: SE | Performed by: NURSE PRACTITIONER

## 2023-11-14 RX ADMIN — PERFLUTREN 1.5 ML OF DILUTION: 6.52 INJECTION, SUSPENSION INTRAVENOUS at 11:32

## 2023-12-01 ENCOUNTER — HOSPITAL ENCOUNTER (OUTPATIENT)
Dept: CARDIOLOGY | Facility: HOSPITAL | Age: 23
Discharge: HOME | End: 2023-12-01
Payer: MEDICAID

## 2023-12-01 PROCEDURE — 93005 ELECTROCARDIOGRAM TRACING: CPT

## 2023-12-05 ENCOUNTER — HOSPITAL ENCOUNTER (OUTPATIENT)
Dept: CARDIOLOGY | Facility: HOSPITAL | Age: 23
Discharge: HOME | End: 2023-12-05
Payer: MEDICAID

## 2023-12-05 LAB
ATRIAL RATE: 110 BPM
P AXIS: 20 DEGREES
P OFFSET: 201 MS
P ONSET: 153 MS
PR INTERVAL: 134 MS
Q ONSET: 220 MS
QRS COUNT: 18 BEATS
QRS DURATION: 82 MS
QT INTERVAL: 342 MS
QTC CALCULATION(BAZETT): 462 MS
QTC FREDERICIA: 418 MS
R AXIS: 34 DEGREES
T AXIS: 13 DEGREES
T OFFSET: 391 MS
VENTRICULAR RATE: 110 BPM

## 2023-12-05 PROCEDURE — 93005 ELECTROCARDIOGRAM TRACING: CPT

## 2023-12-06 LAB
ATRIAL RATE: 125 BPM
P AXIS: 29 DEGREES
P OFFSET: 205 MS
P ONSET: 158 MS
PR INTERVAL: 128 MS
Q ONSET: 222 MS
QRS COUNT: 21 BEATS
QRS DURATION: 80 MS
QT INTERVAL: 304 MS
QTC CALCULATION(BAZETT): 438 MS
QTC FREDERICIA: 388 MS
R AXIS: 32 DEGREES
T AXIS: 20 DEGREES
T OFFSET: 374 MS
VENTRICULAR RATE: 125 BPM

## 2023-12-15 ENCOUNTER — OFFICE VISIT (OUTPATIENT)
Dept: CARDIOLOGY | Facility: CLINIC | Age: 23
End: 2023-12-15
Payer: MEDICAID

## 2023-12-15 VITALS
DIASTOLIC BLOOD PRESSURE: 64 MMHG | SYSTOLIC BLOOD PRESSURE: 103 MMHG | HEIGHT: 67 IN | OXYGEN SATURATION: 98 % | BODY MASS INDEX: 38.45 KG/M2 | WEIGHT: 245 LBS | HEART RATE: 113 BPM

## 2023-12-15 DIAGNOSIS — Z00.00 HEALTHCARE MAINTENANCE: ICD-10-CM

## 2023-12-15 DIAGNOSIS — G90.A POTS (POSTURAL ORTHOSTATIC TACHYCARDIA SYNDROME): Primary | ICD-10-CM

## 2023-12-15 PROCEDURE — 99214 OFFICE O/P EST MOD 30 MIN: CPT | Performed by: NURSE PRACTITIONER

## 2023-12-15 PROCEDURE — 3074F SYST BP LT 130 MM HG: CPT | Performed by: NURSE PRACTITIONER

## 2023-12-15 PROCEDURE — 3078F DIAST BP <80 MM HG: CPT | Performed by: NURSE PRACTITIONER

## 2023-12-15 RX ORDER — METOPROLOL TARTRATE 25 MG/1
25 TABLET, FILM COATED ORAL 2 TIMES DAILY
Qty: 180 TABLET | Refills: 3 | Status: SHIPPED | OUTPATIENT
Start: 2023-12-15 | End: 2024-12-14

## 2023-12-15 NOTE — LETTER
December 15, 2023     Patient: Chaka Beltran   YOB: 2000   Date of Visit: 12/15/2023       To Whom It May Concern:    It is my medical opinion that Chaka Beltran may return to work  immediately with the following restrictions:    Able to sit as needed for dizziness, lightheadedness or fast pulse rate.  A 15 minute break as needed for hydration     If you have any questions or concerns, please don't hesitate to call.         Sincerely,        SARITHA Ivey-CNP    CC: No Recipients

## 2023-12-15 NOTE — PATIENT INSTRUCTIONS
Increase the salt in your diet   Wear compression stockings   Change metoprolol to 25 mg BID   Referral to autonomic clinic for possible TILT

## 2023-12-15 NOTE — PROGRESS NOTES
Primary Care Physician: Shayne Salazar DO  Primary Cardiologist:       Date of Visit: 12/15/2023 10:20 AM EST  Location of visit:  [facility]   Type of Visit: Follow up             Chief Complaint   Patient presents with    Follow-up     6 week follow up, would like to change her metoprolol  due it is causing headaches         HPI / Summary:   Chaka Beltran is a 22 y.o. female  with tachycardia and palpitations    returns for routine follow up to discuss test results     Monitor shows symptomatic sinus tachycardia, symptoms consistent with POTS  Home BP < 120 / 80 mmhg.  She has afternoon headaches since starting metoprolol but has noticed an improvement in her symptoms     12 system review is negative except as noted above         Medical History:   History reviewed. No pertinent past medical history.    Social History:   Tobacco Use: High Risk (12/15/2023)    Patient History     Smoking Tobacco Use: Every Day     Smokeless Tobacco Use: Current     Passive Exposure: Not on file         MEDICATIONS:   Current Outpatient Medications   Medication Instructions    acyclovir (Zovirax) 400 mg tablet     albuterol 90 mcg/actuation inhaler Ventolin HFA 90 mcg/actuation aerosol inhaler    Deep Sea Nasal 0.65 % nasal spray 1 spray, nasal, As needed    EluRyng 0.12-0.015 mg/24 hr vaginal ring 1 each, vaginal, FOR 3 WEEKS THEN 1 WEEK OFF    fluconazole (DIFLUCAN) 150 mg, oral, Daily    fluticasone (Flonase) 50 mcg/actuation nasal spray 2 sprays, Each Nostril, Daily PRN, SHAKE LIQUID AND USE    metoprolol tartrate (LOPRESSOR) 25 mg, oral, 2 times daily    miscellaneous medical supply (Blood Pressure Cuff) misc 1 kit, miscellaneous, Daily         IMAGING REVIEWED: Extended Holter full report reviewed     LABS:  CBC with Differential:    Lab Results   Component Value Date    WBC 8.8 10/23/2023    RBC 5.00 10/23/2023    HGB 14.3 10/23/2023    HCT 42.2 10/23/2023     10/23/2023    MCV 84 10/23/2023    MCH 28.6  "10/23/2023    MCHC 33.9 10/23/2023    RDW 12.6 10/23/2023    NRBC 0.0 10/23/2023    LYMPHOPCT 31.9 10/23/2023    MONOPCT 7.0 10/23/2023    EOSPCT 1.4 10/23/2023    BASOPCT 0.2 10/23/2023    MONOSABS 0.62 10/23/2023    LYMPHSABS 2.81 10/23/2023    EOSABS 0.12 10/23/2023    BASOSABS 0.02 10/23/2023     CMP:    Lab Results   Component Value Date     10/23/2023    K 3.7 10/23/2023     10/23/2023    CO2 22 10/23/2023    BUN 7 10/23/2023    CREATININE 0.79 10/23/2023    GLUCOSE 86 10/23/2023    CALCIUM 9.4 10/23/2023     BMP:    Lab Results   Component Value Date     10/23/2023    K 3.7 10/23/2023     10/23/2023    CO2 22 10/23/2023    BUN 7 10/23/2023    CREATININE 0.79 10/23/2023    CALCIUM 9.4 10/23/2023    GLUCOSE 86 10/23/2023     Magnesium:  Lab Results   Component Value Date    MG 1.63 10/23/2023     Troponin:    Lab Results   Component Value Date    TROPHS <3 10/23/2023    TROPHS <3 10/23/2023    TROPHS <3 10/23/2023     BNP: No results found for: \"BNP\"      Lipid Panel:  No results found for: \"HDL\", \"CHHDL\", \"VLDL\", \"TRIG\", \"NHDL\"     Lab work and imaging results independently reviewed by me     Visit Vitals  /64   Pulse (!) 113   Ht 1.702 m (5' 7\")   Wt 111 kg (245 lb)   SpO2 98%   BMI 38.37 kg/m²   Smoking Status Every Day   BSA 2.29 m²         Constitutional:       Appearance: Healthy appearance. Not in distress.   Eyes:      Conjunctiva/sclera: Conjunctivae normal.   Neck:      Vascular: JVD normal.   Pulmonary:      Effort: Pulmonary effort is normal.      Breath sounds: Normal breath sounds.   Cardiovascular:      PMI at left midclavicular line. Normal rate. Regular rhythm. Normal S1. Normal S2.       Murmurs: There is no murmur.      No rub.   Pulses:     Intact distal pulses.   Edema:     Peripheral edema absent.   Abdominal:      General: Bowel sounds are normal.   Musculoskeletal:      Cervical back: Neck supple. Skin:     General: Skin is warm and dry.   Neurological:      " Mental Status: Alert and oriented to person, place and time.           Problem List Items Addressed This Visit    None  Visit Diagnoses         Codes    POTS (postural orthostatic tachycardia syndrome)    -  Primary G90.A    Relevant Medications    metoprolol tartrate (Lopressor) 25 mg tablet    Other Relevant Orders    Referral to Neurology            Referral to autonomic clinic   Compression stockings   Increase dietary sodium   Change metoprolol to 25 mg BID         12/15/23 at 10:58 AM - MAXIMINO Ivey      Orders:  Orders Placed This Encounter   Procedures    Referral to Neurology         Followup Appts:  Future Appointments   Date Time Provider Department Center   6/18/2024 11:00 AM MAXIMINO Ivey [id number] [place]

## 2024-01-05 ENCOUNTER — OFFICE VISIT (OUTPATIENT)
Dept: PRIMARY CARE | Facility: CLINIC | Age: 24
End: 2024-01-05
Payer: MEDICAID

## 2024-01-05 ENCOUNTER — LAB (OUTPATIENT)
Dept: LAB | Facility: LAB | Age: 24
End: 2024-01-05
Payer: MEDICAID

## 2024-01-05 VITALS
SYSTOLIC BLOOD PRESSURE: 108 MMHG | OXYGEN SATURATION: 98 % | BODY MASS INDEX: 39.36 KG/M2 | DIASTOLIC BLOOD PRESSURE: 75 MMHG | WEIGHT: 250.8 LBS | HEIGHT: 67 IN | TEMPERATURE: 97 F | HEART RATE: 96 BPM

## 2024-01-05 DIAGNOSIS — Z00.00 HEALTHCARE MAINTENANCE: ICD-10-CM

## 2024-01-05 DIAGNOSIS — G47.30 SLEEP APNEA, UNSPECIFIED TYPE: ICD-10-CM

## 2024-01-05 DIAGNOSIS — E78.5 HYPERLIPIDEMIA, UNSPECIFIED HYPERLIPIDEMIA TYPE: ICD-10-CM

## 2024-01-05 DIAGNOSIS — F41.9 ANXIETY: ICD-10-CM

## 2024-01-05 DIAGNOSIS — Z00.00 HEALTHCARE MAINTENANCE: Primary | ICD-10-CM

## 2024-01-05 DIAGNOSIS — E66.01 CLASS 2 SEVERE OBESITY WITH SERIOUS COMORBIDITY AND BODY MASS INDEX (BMI) OF 39.0 TO 39.9 IN ADULT, UNSPECIFIED OBESITY TYPE (MULTI): ICD-10-CM

## 2024-01-05 PROBLEM — R79.89 ELEVATED D-DIMER: Status: RESOLVED | Noted: 2023-10-23 | Resolved: 2024-01-05

## 2024-01-05 PROBLEM — K08.89 TOOTH ACHE: Status: RESOLVED | Noted: 2023-10-28 | Resolved: 2024-01-05

## 2024-01-05 PROBLEM — B34.9 VIRAL INFECTION: Status: RESOLVED | Noted: 2023-10-23 | Resolved: 2024-01-05

## 2024-01-05 PROBLEM — R06.02 SHORTNESS OF BREATH: Status: RESOLVED | Noted: 2023-10-23 | Resolved: 2024-01-05

## 2024-01-05 PROBLEM — K04.7 DENTAL INFECTION: Status: RESOLVED | Noted: 2023-10-28 | Resolved: 2024-01-05

## 2024-01-05 PROBLEM — F07.81 POST CONCUSSION SYNDROME: Status: RESOLVED | Noted: 2017-12-05 | Resolved: 2024-01-05

## 2024-01-05 LAB
ALBUMIN SERPL BCP-MCNC: 3.8 G/DL (ref 3.4–5)
ALP SERPL-CCNC: 49 U/L (ref 33–110)
ALT SERPL W P-5'-P-CCNC: 17 U/L (ref 7–45)
ANION GAP SERPL CALC-SCNC: 13 MMOL/L (ref 10–20)
AST SERPL W P-5'-P-CCNC: 16 U/L (ref 9–39)
BILIRUB SERPL-MCNC: 0.4 MG/DL (ref 0–1.2)
BUN SERPL-MCNC: 10 MG/DL (ref 6–23)
CALCIUM SERPL-MCNC: 8.9 MG/DL (ref 8.6–10.3)
CHLORIDE SERPL-SCNC: 106 MMOL/L (ref 98–107)
CHOLEST SERPL-MCNC: 128 MG/DL (ref 0–199)
CHOLESTEROL/HDL RATIO: 2.2
CO2 SERPL-SCNC: 24 MMOL/L (ref 21–32)
CREAT SERPL-MCNC: 0.72 MG/DL (ref 0.5–1.05)
GFR SERPL CREATININE-BSD FRML MDRD: >90 ML/MIN/1.73M*2
GLUCOSE SERPL-MCNC: 82 MG/DL (ref 74–99)
HDLC SERPL-MCNC: 59.4 MG/DL
LDLC SERPL CALC-MCNC: 40 MG/DL
NON HDL CHOLESTEROL: 69 MG/DL (ref 0–149)
POTASSIUM SERPL-SCNC: 3.9 MMOL/L (ref 3.5–5.3)
PROT SERPL-MCNC: 6.7 G/DL (ref 6.4–8.2)
SODIUM SERPL-SCNC: 139 MMOL/L (ref 136–145)
T4 FREE SERPL-MCNC: 0.7 NG/DL (ref 0.61–1.12)
TRIGL SERPL-MCNC: 143 MG/DL (ref 0–149)
TSH SERPL-ACNC: 4.3 MIU/L (ref 0.44–3.98)
VLDL: 29 MG/DL (ref 0–40)

## 2024-01-05 PROCEDURE — 99385 PREV VISIT NEW AGE 18-39: CPT | Performed by: FAMILY MEDICINE

## 2024-01-05 PROCEDURE — 3074F SYST BP LT 130 MM HG: CPT | Performed by: FAMILY MEDICINE

## 2024-01-05 PROCEDURE — 3078F DIAST BP <80 MM HG: CPT | Performed by: FAMILY MEDICINE

## 2024-01-05 PROCEDURE — 36415 COLL VENOUS BLD VENIPUNCTURE: CPT

## 2024-01-05 PROCEDURE — 3008F BODY MASS INDEX DOCD: CPT | Performed by: FAMILY MEDICINE

## 2024-01-05 ASSESSMENT — ENCOUNTER SYMPTOMS
LOSS OF SENSATION IN FEET: 0
OCCASIONAL FEELINGS OF UNSTEADINESS: 0
DEPRESSION: 0

## 2024-01-05 ASSESSMENT — PATIENT HEALTH QUESTIONNAIRE - PHQ9
SUM OF ALL RESPONSES TO PHQ9 QUESTIONS 1 AND 2: 0
1. LITTLE INTEREST OR PLEASURE IN DOING THINGS: NOT AT ALL
1. LITTLE INTEREST OR PLEASURE IN DOING THINGS: NOT AT ALL
2. FEELING DOWN, DEPRESSED OR HOPELESS: NOT AT ALL
SUM OF ALL RESPONSES TO PHQ9 QUESTIONS 1 AND 2: 0
2. FEELING DOWN, DEPRESSED OR HOPELESS: NOT AT ALL

## 2024-01-05 NOTE — PROGRESS NOTES
"Subjective   Patient ID: Chaka Beltran is a 23 y.o. female who presents for New Patient Visit (Healthcare maintenance /Can not sleep/weight).    HPI  New patient here to establish care and for annual physical  Has trouble sleeping, has daytime fatigue, no amount of sleep helps. Has trouble falling asleep and staying asleep. Does snore.   BMI 39, patient admits that she hasn't been as active, and not watching her diet, does skip meals as well  Has tachycardia, following with cardiology, on metoprolol. Going to follow up with neurology for rule out of POTS  Does have anxiety, manageable, does not want to start any medications    Review of Systems  General: no fever  Eyes: no blurry vision  ENT: no sore throat, no ear pain  Resp: no cough, sob or wheezing  Cardio: no chest pain, no palpitations  Abd: no nausea/vomiting  : no dysuria, no increased urinary frequency      /75   Pulse 96   Temp 36.1 °C (97 °F)   Ht 1.702 m (5' 7\")   Wt 114 kg (250 lb 12.8 oz)   SpO2 98%   BMI 39.28 kg/m²       Objective   Physical Exam  Gen: NAD, alert  Head: normocephalic/atraumatic  Eyes: conjunctivae normal  Ears: canals clear bilaterally, TM normal   Nose: external nose normal   Oropharynx: clear   Resp: Clear to auscultation  CVS: Regular rate and rhythm  Abdomen: soft, NT, ND  Ext: no edema, NT of lower extremities  Neuro: gait normal     Assessment/Plan   Problem List Items Addressed This Visit       Anxiety     Other Visit Diagnoses       Healthcare maintenance    -  Primary    Relevant Orders    Lipid Panel    TSH with reflex to Free T4 if abnormal    Comprehensive Metabolic Panel    Sleep apnea, unspecified type        Relevant Orders    In-Center Sleep Study (Non-Sleep Provider Only)    Hyperlipidemia, unspecified hyperlipidemia type        Relevant Orders    Lipid Panel    TSH with reflex to Free T4 if abnormal    Comprehensive Metabolic Panel    Class 2 severe obesity with serious comorbidity and body mass index " (BMI) of 39.0 to 39.9 in adult, unspecified obesity type (CMS/HCC)

## 2024-01-24 ENCOUNTER — TELEPHONE (OUTPATIENT)
Dept: CARDIOLOGY | Facility: CLINIC | Age: 24
End: 2024-01-24
Payer: MEDICAID

## 2024-01-24 DIAGNOSIS — G90.A POTS (POSTURAL ORTHOSTATIC TACHYCARDIA SYNDROME): Primary | ICD-10-CM

## 2024-01-24 NOTE — TELEPHONE ENCOUNTER
----- Message from Fartun M Erik sent at 1/24/2024 11:41 AM EST -----  Regarding: having issues  She left a message that she left work because she feels like she is going to pass out and is having pains, please call her [phone]          I called Chaka.  I asked her to decrease metoprolol to 25 mg at bedtime and will order a TILT.  She will have to schedule at [facility]   01/26/24 at 2:31 PM - SARITHA Ivey-CNP

## 2024-01-28 ENCOUNTER — CLINICAL SUPPORT (OUTPATIENT)
Dept: SLEEP MEDICINE | Facility: CLINIC | Age: 24
End: 2024-01-28
Payer: MEDICAID

## 2024-01-28 DIAGNOSIS — G47.9 SLEEP DISORDER, UNSPECIFIED: ICD-10-CM

## 2024-01-28 DIAGNOSIS — G47.30 SLEEP APNEA, UNSPECIFIED TYPE: ICD-10-CM

## 2024-01-28 PROCEDURE — 95810 POLYSOM 6/> YRS 4/> PARAM: CPT | Performed by: PSYCHIATRY & NEUROLOGY

## 2024-01-29 VITALS
RESPIRATION RATE: 16 BRPM | HEART RATE: 90 BPM | DIASTOLIC BLOOD PRESSURE: 88 MMHG | OXYGEN SATURATION: 95 % | SYSTOLIC BLOOD PRESSURE: 143 MMHG

## 2024-01-29 NOTE — PROGRESS NOTES
[facility] TECH NOTE:     Patient: Chaka Beltran   MRN//AGE: [id number]  2000  23 y.o.   Technologist: Sulema Garcia RRT   Room: 107   Service Date: 2024        Sleep Testing Location: [facility]:     TECHNOLOGIST SLEEP STUDY PROCEDURE NOTE:   This sleep study is being conducted according to the policies and procedures outlined by the [employer]AS accreditation standards.  The sleep study procedure and processes involved during this appointment was explained to the patient/patient’s family, questions were answered. The patient/family verbalized understanding.      The patient is a 23 y.o. year old female scheduled for a Diagnostic PSG . she arrived for her appointment.      The study that was ultimately completed was a Diagnostic PSG .    The full study Was completed.  Patient questionnaires completed?: yes     Consents signed? yes    Initial Fall Risk Screening:     Chaka has not fallen in the last 6 months. her did not result in injury. Chaka does not have a fear of falling. He does not need assistance with sitting, standing, or walking. she does not need assistance walking in her home. she does not need assistance in an unfamiliar setting. The patient is notusing an assistive device.     Brief Study observations: Patient came in for a PSG study. Patient had Arousal of Spontaneous and Limb movements. Patient had very few Respiratory events. Patient had intermittent, mild snoring. Patient was up 1 time throughout the night. Patient did go into REM.     Deviation to order/protocol and reason:       If PAP, which was preferred mask/pressure/mode:       Other:None    After the procedure, the patient/family was informed to ensure followup with ordering clinician for testing results.      Technologist: Sulema Garcia RRT

## 2024-02-06 ENCOUNTER — OFFICE VISIT (OUTPATIENT)
Dept: PRIMARY CARE | Facility: CLINIC | Age: 24
End: 2024-02-06
Payer: MEDICAID

## 2024-02-06 VITALS
DIASTOLIC BLOOD PRESSURE: 75 MMHG | HEART RATE: 98 BPM | WEIGHT: 247 LBS | SYSTOLIC BLOOD PRESSURE: 108 MMHG | OXYGEN SATURATION: 97 % | TEMPERATURE: 97 F | BODY MASS INDEX: 38.77 KG/M2 | HEIGHT: 67 IN

## 2024-02-06 DIAGNOSIS — J06.9 ACUTE URI: ICD-10-CM

## 2024-02-06 DIAGNOSIS — R05.1 ACUTE COUGH: ICD-10-CM

## 2024-02-06 DIAGNOSIS — R19.7 DIARRHEA OF PRESUMED INFECTIOUS ORIGIN: Primary | ICD-10-CM

## 2024-02-06 PROCEDURE — 3074F SYST BP LT 130 MM HG: CPT | Performed by: FAMILY MEDICINE

## 2024-02-06 PROCEDURE — 87637 SARSCOV2&INF A&B&RSV AMP PRB: CPT

## 2024-02-06 PROCEDURE — 3008F BODY MASS INDEX DOCD: CPT | Performed by: FAMILY MEDICINE

## 2024-02-06 PROCEDURE — 3078F DIAST BP <80 MM HG: CPT | Performed by: FAMILY MEDICINE

## 2024-02-06 PROCEDURE — 99213 OFFICE O/P EST LOW 20 MIN: CPT | Performed by: FAMILY MEDICINE

## 2024-02-06 ASSESSMENT — PATIENT HEALTH QUESTIONNAIRE - PHQ9
1. LITTLE INTEREST OR PLEASURE IN DOING THINGS: NOT AT ALL
2. FEELING DOWN, DEPRESSED OR HOPELESS: NOT AT ALL
SUM OF ALL RESPONSES TO PHQ9 QUESTIONS 1 AND 2: 0

## 2024-02-06 ASSESSMENT — ENCOUNTER SYMPTOMS
DEPRESSION: 0
OCCASIONAL FEELINGS OF UNSTEADINESS: 0
LOSS OF SENSATION IN FEET: 0

## 2024-02-06 NOTE — PROGRESS NOTES
"Subjective   Patient ID: Chaka Beltran is a 23 y.o. female who presents for Sinusitis (3-4 days), Diarrhea (X2 weeks), and Nausea.    HPI  Here for urgent visit  Has been having diarrhea x 2 weeks, constant. No odor or abnormal color. No blood. Loose stools.   Has been having nausea off and on, no vomiting  No fever, but has been having chills  Also has been having sinus pressure and cough x 3-4 days. Has body aches.     Review of Systems  As per HPI    /75   Pulse 98   Temp 36.1 °C (97 °F)   Ht 1.702 m (5' 7\")   Wt 112 kg (247 lb)   SpO2 97%   BMI 38.69 kg/m²       Objective   Physical Exam  Gen: NAD, alert  Head: normocephalic/atraumatic  Eyes: conjunctivae normal  Ears: canals clear bilaterally, TM normal   Nose: external nose normal   Oropharynx: clear   Resp: Clear to auscultation  CVS: Regular rate and rhythm  Abdomen: soft, NT, ND  Ext: no edema, NT of lower extremities  Neuro: gait normal     Assessment/Plan   Problem List Items Addressed This Visit    None  Visit Diagnoses       Diarrhea of presumed infectious origin    -  Primary    Relevant Orders    C. difficile, PCR    Stool Pathogen Panel, PCR    Acute cough        Relevant Orders    Sars-CoV-2 and Influenza A/B PCR (Completed)    RSV PCR (Completed)    Acute URI      Supportive care               "

## 2024-02-07 LAB
FLUAV RNA RESP QL NAA+PROBE: NOT DETECTED
FLUBV RNA RESP QL NAA+PROBE: NOT DETECTED
RSV RNA RESP QL NAA+PROBE: NOT DETECTED
SARS-COV-2 RNA RESP QL NAA+PROBE: NOT DETECTED

## 2024-02-21 ENCOUNTER — TELEPHONE (OUTPATIENT)
Dept: PRIMARY CARE | Facility: CLINIC | Age: 24
End: 2024-02-21
Payer: MEDICAID

## 2024-02-23 ENCOUNTER — TELEMEDICINE (OUTPATIENT)
Dept: PRIMARY CARE | Facility: CLINIC | Age: 24
End: 2024-02-23
Payer: MEDICAID

## 2024-02-23 DIAGNOSIS — R79.89 ABNORMAL TSH: ICD-10-CM

## 2024-02-23 DIAGNOSIS — F51.04 CHRONIC INSOMNIA: Primary | ICD-10-CM

## 2024-02-23 DIAGNOSIS — G47.8 SLEEP TALKING: ICD-10-CM

## 2024-02-23 DIAGNOSIS — G25.81 RESTLESS LEG: ICD-10-CM

## 2024-02-23 PROCEDURE — 3008F BODY MASS INDEX DOCD: CPT | Performed by: FAMILY MEDICINE

## 2024-02-23 PROCEDURE — 99213 OFFICE O/P EST LOW 20 MIN: CPT | Performed by: FAMILY MEDICINE

## 2024-02-23 NOTE — PROGRESS NOTES
Subjective   Patient ID: Chaka Beltran is a 23 y.o. female who presents for follow up of sleep study    HPI  Virtual Visit    An interactive audio and video telecommunication system which permits real time communications between the patient (at the originating site) and provider (at the distant site) was utilized to provide this telehealth service.   Verbal consent was requested and obtained from Chaka Beltran on this date, 02/23/24 for a telehealth visit.     Here to follow up on sleep study. Has chronic insomnia, has trouble falling and staying asleep, has tried melatonin, does not help. Has daytime fatigue, as well as does sleep talk during sleep. Got sleep study, was negative for MILLA, but concerns for restless leg and narcolepsy. Does not think she has restless legs but does toss a lot during sleep, this was also evident during sleep study.     Review of Systems  General: no fever  Eyes: no blurry vision  ENT: no sore throat, no ear pain  Resp: no cough, sob or wheezing  Cardio: no chest pain, no palpitations  Abd: no nausea/vomiting  : no dysuria, no increased urinary frequency    Vitals:   due to telehealth visit, vitals not obtained, patient did not have them available at the time of the visit    Objective   Physical Exam  Physical exam done virtually through the computer screen     Gen: NAD  eyes: conjunctivae normal   Nose: external nose normal   Resp: does not appear to be short of breath at present time, no audible wheezing    Assessment/Plan   Problem List Items Addressed This Visit    None  Visit Diagnoses       Chronic insomnia    -  Primary    Relevant Orders    Referral to Adult Sleep Medicine    Sleep talking        Relevant Orders    Referral to Adult Sleep Medicine    Abnormal TSH        Relevant Orders    TSH with reflex to Free T4 if abnormal    Restless leg        Relevant Orders    Ferritin    Iron and TIBC    CBC

## 2024-02-24 ENCOUNTER — LAB (OUTPATIENT)
Dept: LAB | Facility: LAB | Age: 24
End: 2024-02-24
Payer: MEDICAID

## 2024-02-24 DIAGNOSIS — R79.89 ABNORMAL TSH: ICD-10-CM

## 2024-02-24 DIAGNOSIS — G25.81 RESTLESS LEG: ICD-10-CM

## 2024-02-24 LAB
ERYTHROCYTE [DISTWIDTH] IN BLOOD BY AUTOMATED COUNT: 12.8 % (ref 11.5–14.5)
FERRITIN SERPL-MCNC: 88 NG/ML (ref 8–150)
HCT VFR BLD AUTO: 43.5 % (ref 36–46)
HGB BLD-MCNC: 14.5 G/DL (ref 12–16)
IRON SATN MFR SERPL: 28 % (ref 25–45)
IRON SERPL-MCNC: 114 UG/DL (ref 35–150)
MCH RBC QN AUTO: 28.4 PG (ref 26–34)
MCHC RBC AUTO-ENTMCNC: 33.3 G/DL (ref 32–36)
MCV RBC AUTO: 85 FL (ref 80–100)
NRBC BLD-RTO: 0 /100 WBCS (ref 0–0)
PLATELET # BLD AUTO: 247 X10*3/UL (ref 150–450)
RBC # BLD AUTO: 5.1 X10*6/UL (ref 4–5.2)
TIBC SERPL-MCNC: 410 UG/DL (ref 240–445)
TSH SERPL-ACNC: 2.18 MIU/L (ref 0.44–3.98)
UIBC SERPL-MCNC: 296 UG/DL (ref 110–370)
WBC # BLD AUTO: 5.9 X10*3/UL (ref 4.4–11.3)

## 2024-02-24 PROCEDURE — 36415 COLL VENOUS BLD VENIPUNCTURE: CPT

## 2024-02-26 ENCOUNTER — OFFICE VISIT (OUTPATIENT)
Dept: SLEEP MEDICINE | Facility: CLINIC | Age: 24
End: 2024-02-26
Payer: MEDICAID

## 2024-02-26 VITALS
SYSTOLIC BLOOD PRESSURE: 114 MMHG | OXYGEN SATURATION: 97 % | WEIGHT: 248 LBS | BODY MASS INDEX: 38.84 KG/M2 | DIASTOLIC BLOOD PRESSURE: 81 MMHG | HEART RATE: 106 BPM

## 2024-02-26 DIAGNOSIS — R45.89 DEPRESSED MOOD: ICD-10-CM

## 2024-02-26 DIAGNOSIS — G47.8 SLEEP TALKING: ICD-10-CM

## 2024-02-26 DIAGNOSIS — E66.09 CLASS 2 OBESITY DUE TO EXCESS CALORIES WITHOUT SERIOUS COMORBIDITY WITH BODY MASS INDEX (BMI) OF 38.0 TO 38.9 IN ADULT: ICD-10-CM

## 2024-02-26 DIAGNOSIS — G47.30 SLEEP-DISORDERED BREATHING: Primary | ICD-10-CM

## 2024-02-26 DIAGNOSIS — F51.04 CHRONIC INSOMNIA: ICD-10-CM

## 2024-02-26 DIAGNOSIS — R40.0 DAYTIME SLEEPINESS: ICD-10-CM

## 2024-02-26 PROCEDURE — 3008F BODY MASS INDEX DOCD: CPT | Performed by: PSYCHIATRY & NEUROLOGY

## 2024-02-26 PROCEDURE — 99215 OFFICE O/P EST HI 40 MIN: CPT | Performed by: PSYCHIATRY & NEUROLOGY

## 2024-02-26 PROCEDURE — 3074F SYST BP LT 130 MM HG: CPT | Performed by: PSYCHIATRY & NEUROLOGY

## 2024-02-26 PROCEDURE — 3079F DIAST BP 80-89 MM HG: CPT | Performed by: PSYCHIATRY & NEUROLOGY

## 2024-02-26 ASSESSMENT — PATIENT HEALTH QUESTIONNAIRE - PHQ9
SUM OF ALL RESPONSES TO PHQ9 QUESTIONS 1 AND 2: 3
2. FEELING DOWN, DEPRESSED OR HOPELESS: SEVERAL DAYS
1. LITTLE INTEREST OR PLEASURE IN DOING THINGS: MORE THAN HALF THE DAYS

## 2024-02-26 NOTE — PROGRESS NOTES
Patient: Chaka Beltran    [id number]  : 2000 -- AGE 23 y.o.    Provider: Jose Pink MD     [facility]   Service Date: 2024              [facility]  New Visit Note        The patient's referring provider is: Barry Man*    HPI:  Chaka Beltran is a 23 y.o. female with chronic insomnia and sleep talking, and PMH notable for elevated blood pressure reading, HLD, asthma, paroxysmal SVT, ongoing workup for POTS, obesity, and anxiety, who presents today as a referral from her PCP due to insomnia and sleep talking following recent sleep study.    She has difficulty falling and staying asleep. Between the ages of [age] years old she had times that she would be awake for 3 days straight and then crash really hard, and instead of sleeping those nights she would do things like rearrange her room or put on make-up. Has tried melatonin that only helped for around a month. Even as a baby she was a poor sleeper and had colic and did not want to take naps.    She gets tired in the evening and then gets a second wind around 10-11 pm and then cannot sleep until 2-4 AM, and she can nap all day. Recently has been listening to brown noise during the night and she wakes up feeling like she slept better, though still wakes up to urinate. Vapes (nicotine) all day, including before bedtime.    She sleep talks at least once a week. During sleep she can sit up and hold a conversation with her boyfriend.       NIGHTTIME SYMPTOMS:   Snoring: yes, sometimes, moderately loud, unsure if louder when supine  Witnessed apnea: no  Nocturnal gasping: No  Nocturnal choking: No  Sleep walking: rarely as a child  Sleep talking:   yes     Dream enactment: no  Sleep paralysis: no  Hypnagogic/hypnopompic hallucinations: no  Bedroom environment is conducive to sleep: no    DAYTIME SYMPTOMS  [place]: 10/24  RODRÍGUEZ:   Daytime sleepiness: yes, feels she can nap any time of  "day  Fatigue: yes  Trouble with memory/concentration: generally  Dozing: generally no  Feeling sleepy while driving:  sometimes gets \"highway hypnosis\" and will call someone to help keep her alert    RLS symptoms: No. She was not comfortable with the leg wires during the study, which caused her to move her legs due to the wires getting caught up in her blanket.  -Normal iron studies, see below    Cataplexy: No    SLEEP HABITS:   Self-described night person  Preferred sleep position: lateral  Bedtime: around 10 PM, asks boyfriend to rub her back, uses her cell phone, some nights is not tired and is awake for 1-2 hours, may then go to the couch. Asleep by 12 AM.  Wake time: 6:30 am  # of nocturnal awakenings: usually at 2 AM and 4 AM. After the 4 AM awakening she is usually awake until 5:40 AM because she feels refreshed as if it is time to start the day, then returns to sleep until the alarm goes off and then wakes up feeling worse, feeling \"like a zombie\"  Napping: after work around 4 pm for around 3 hours - most days.   Total estimated sleep per 24 hrs: 5 hours at night and 3 hour naps    PRIOR SLEEP STUDIES:  PSG 1/28/2024: BMI 39.2.  Negative for MILLA overall, but mild MILLA during REM.  Overall RDI3% 3.4, RDI4% 2.0.  REM RDI3% 2.2 versus 1.7 in non-REM, REM RDI4% 7.8 versus 0.8 in non-REM mean SpO2 96%, santa 90%.  PLMS 0, though there were frequent leg movements during wake with index of 57/h.  Arousal index 9/h, about half of which were spontaneous.  Sleep latency delayed at 52 minutes.  Normal REM latency at 83.5 minutes.  Sleep efficiency 81%.  There was a period of wake around 2 AM for approximately a quarter of an hour.  There were a few very brief awakenings throughout the night. At 2:07 AM she sat up during sleep following a few breaths with mild flow limitation, and she states that this was the start of her typical sleep talking event, but she woke herself up. Raw PSG data reviewed personally by " me.        Patient Active Problem List   Diagnosis    Chest pain    Tachycardia    Sleep disturbance    Asthma, mild persistent    Paroxysmal supraventricular tachycardia    Primary hypertension    Anxiety     Past Medical History:   Diagnosis Date    Asthma      Past Surgical History:   Procedure Laterality Date    WISDOM TOOTH EXTRACTION       Current Outpatient Medications   Medication Sig Dispense Refill    acyclovir (Zovirax) 400 mg tablet       albuterol 90 mcg/actuation inhaler Ventolin HFA 90 mcg/actuation aerosol inhaler      Deep Sea Nasal 0.65 % nasal spray Administer 1 spray into affected nostril(s) if needed.      EluRyng 0.12-0.015 mg/24 hr vaginal ring Insert 1 each into the vagina. FOR 3 WEEKS THEN 1 WEEK OFF      fluticasone (Flonase) 50 mcg/actuation nasal spray Administer 2 sprays into each nostril once daily as needed for allergies or rhinitis. SHAKE LIQUID AND USE      metoprolol tartrate (Lopressor) 25 mg tablet Take 1 tablet (25 mg) by mouth 2 times a day. 180 tablet 3    miscellaneous medical supply (Blood Pressure Cuff) misc 1 kit once daily. 1 each 0     No current facility-administered medications for this visit.     Allergies   Allergen Reactions    Metoclopramide Anaphylaxis    Ondansetron Hcl Unknown     Felt like skin was crawling    Reglan [Metoclopramide Hcl] Hives       Family History   Problem Relation Name Age of Onset    Hypertension Mother      Thyroid disease Mother      Sleep apnea Mother          resolved after bariatric surgery    Hypertension Father      Thyroid disease Father      Sleep apnea Father      Diabetes Sister      Sleep apnea Maternal Grandmother      Heart failure Paternal Grandmother      Sleep apnea Paternal Grandmother         SOCIAL HISTORY  Employment: works in a factory  Lives with: boyfriend and 2 dogs  Alcohol: none  Cigarettes: vapes   Illicits: none  Caffeine: very rare     ROS: 12 point ROS positive for nasal congestion, headaches, joint pains that  "can affect sleep, and low mood. She endorses having had thoughts of passive suicidal ideation, but has plenty of reasons to live for, but does not want medication (has tried meds in the past) and does not want to be hospitalized.    PHYSICAL EXAMINATION:   Vitals:    02/26/24 0915   BP: 114/81   BP Location: Left arm   Patient Position: Sitting   BP Cuff Size: Large adult   Pulse: 106   SpO2: 97%   Weight: 112 kg (248 lb)     Body mass index is 38.84 kg/m².  General: Awake. Alert. Comfortable. No apparent distress.   Speech: Normal  Comprehension: Normal  Mood: Stable  Affect: Appropriate  Eyes:   Eyelids: normal            ENT:          Nares are patent bilaterally. Septum deviation absent. Barney tongue position class II. Tongue scalloping is present, tongue is  enlarged, soft palate is not elongated, hard palate is not high arched. Uvula is not enlarged. Retrognathia is not present. Tonsils are 2+. Dentition very good.           Neck:          Circumference: 17.5\"  Cardiac: Regular in rate and rhythm. No murmurs. No edema in bilateral lower extremities.  Pul:         Clear to auscultation bilaterally. Normal respiratory effort   Abd:         obese  Neuro: Alert, well-oriented. Cranial nerves II-XII grossly normal and symmetric.  Moves all limbs symmetrically with no evidence of significant focal weakness. No abnormal movements noted. Normal gait      LABS/DIAGNOSTICS:  Lab Results   Component Value Date    HGB 14.5 02/24/2024    CO2 24 01/05/2024    TSH 2.18 02/24/2024    FREET4 0.70 01/05/2024    FERRITIN 88 02/24/2024    IRON 114 02/24/2024    TIBC 410 02/24/2024    IRONSAT 28 02/24/2024        Echo 10/31/2023: LVEF 60-65%..  No diastolic dysfunction.  Tilt table test: Pending      ASSESSMENT AND PLAN: Ms. Chaka Beltran is a 23 y.o. female with a history of chronic insomnia, sleep talking, daytime sleepiness.     #sleep disordered breathing - no sleep apnea overall on sleep study, but mild sleep apnea only " "during REM sleep  -weight loss encouraged    #chronic insomnia - suboptimal sleep hygiene, overthinking/anxiety  Reviewed stimulus control and sleep hygiene  -use the \"20 minute rule\"  -nap no more than 20-30 minutes in the daytime, not in the evening hours  -no smoking in the hours before bed  -sleep tips sheet provided and reviewed with pt    #sleep talking - may be triggered by upper airway resistance, stress, internal/external stimulation  -avoid smoking before bed  -improve sleep habits (as above)    #daytime sleepiness - impaired sleep hygiene/insufficient sleep at night  -address insomnia as above  -weight loss encouraged    #obesity  -weight loss encouraged    #depressed mood   -management per PCP/psych      All of the above was discussed with the patient in detail. She voiced an understanding of the above and was agreeable to proceed further as advised.     Around 45 minutes were spent on this encounter, including time reviewing the chart, conducting the H&P, counseling the patient, and documenting/placing orders.    FOLLOW UP:  pt prefers to follow-up as needed    Copy: Redd Man MD   "

## 2024-02-26 NOTE — PATIENT INSTRUCTIONS
"Your sleep study was overall fairly normal, but showed mild sleep apnea during REM sleep, so likely losing a few pounds would help with this and can help reduce your frequency of sleep talking.    Other causes of sleep talking include: stress, insufficient sleep, alcohol, outside noises, and sleep apnea, for example.    For your insomnia:  -use the \"20 minute rule\"  -nap no more than 20-30 minutes in the daytime, not in the evening hours  -no smoking in the hours before bed  "

## 2024-02-29 ENCOUNTER — TELEPHONE (OUTPATIENT)
Dept: PRIMARY CARE | Facility: CLINIC | Age: 24
End: 2024-02-29
Payer: MEDICAID

## 2024-02-29 DIAGNOSIS — N91.2 AMENORRHEA: Primary | ICD-10-CM

## 2024-02-29 NOTE — TELEPHONE ENCOUNTER
BRIANN 1/15-1/22, patient believes she is on birth control (ring), believes she is pregnant.  Took 2 home pregnancy tests and they are negative.  She would like bloodwork to see if she is pregnant, are you able to order?

## 2024-04-02 ENCOUNTER — APPOINTMENT (OUTPATIENT)
Dept: NEUROLOGY | Facility: CLINIC | Age: 24
End: 2024-04-02
Payer: MEDICAID

## 2024-04-05 ENCOUNTER — APPOINTMENT (OUTPATIENT)
Dept: PRIMARY CARE | Facility: CLINIC | Age: 24
End: 2024-04-05
Payer: MEDICAID

## 2024-05-01 PROCEDURE — 88175 CYTOPATH C/V AUTO FLUID REDO: CPT

## 2024-05-02 ENCOUNTER — LAB REQUISITION (OUTPATIENT)
Dept: LAB | Facility: HOSPITAL | Age: 24
End: 2024-05-02
Payer: MEDICAID

## 2024-05-02 DIAGNOSIS — Z11.51 ENCOUNTER FOR SCREENING FOR HUMAN PAPILLOMAVIRUS (HPV): ICD-10-CM

## 2024-05-02 DIAGNOSIS — Z01.419 ENCOUNTER FOR GYNECOLOGICAL EXAMINATION (GENERAL) (ROUTINE) WITHOUT ABNORMAL FINDINGS: ICD-10-CM

## 2024-05-06 ENCOUNTER — HOSPITAL ENCOUNTER (OUTPATIENT)
Dept: CARDIOLOGY | Facility: HOSPITAL | Age: 24
Discharge: HOME | End: 2024-05-06
Payer: MEDICAID

## 2024-05-06 ENCOUNTER — LAB (OUTPATIENT)
Dept: LAB | Facility: LAB | Age: 24
End: 2024-05-06
Payer: MEDICAID

## 2024-05-06 DIAGNOSIS — R00.0 TACHYCARDIA: ICD-10-CM

## 2024-05-06 DIAGNOSIS — N91.2 AMENORRHEA: ICD-10-CM

## 2024-05-06 DIAGNOSIS — I47.10 PAROXYSMAL SUPRAVENTRICULAR TACHYCARDIA (CMS-HCC): ICD-10-CM

## 2024-05-06 DIAGNOSIS — R07.9 CHEST PAIN, UNSPECIFIED TYPE: ICD-10-CM

## 2024-05-06 LAB
ALBUMIN SERPL BCP-MCNC: 4 G/DL (ref 3.4–5)
ALP SERPL-CCNC: 63 U/L (ref 33–110)
ALT SERPL W P-5'-P-CCNC: 16 U/L (ref 7–45)
ANION GAP SERPL CALC-SCNC: 12 MMOL/L (ref 10–20)
AST SERPL W P-5'-P-CCNC: 17 U/L (ref 9–39)
B-HCG SERPL-ACNC: <2 MIU/ML
BASOPHILS # BLD AUTO: 0.02 X10*3/UL (ref 0–0.1)
BASOPHILS NFR BLD AUTO: 0.3 %
BILIRUB SERPL-MCNC: 0.3 MG/DL (ref 0–1.2)
BUN SERPL-MCNC: 7 MG/DL (ref 6–23)
CALCIUM SERPL-MCNC: 9.7 MG/DL (ref 8.6–10.3)
CHLORIDE SERPL-SCNC: 106 MMOL/L (ref 98–107)
CO2 SERPL-SCNC: 25 MMOL/L (ref 21–32)
CREAT SERPL-MCNC: 0.78 MG/DL (ref 0.5–1.05)
EGFRCR SERPLBLD CKD-EPI 2021: >90 ML/MIN/1.73M*2
EOSINOPHIL # BLD AUTO: 0.1 X10*3/UL (ref 0–0.7)
EOSINOPHIL NFR BLD AUTO: 1.6 %
ERYTHROCYTE [DISTWIDTH] IN BLOOD BY AUTOMATED COUNT: 13.1 % (ref 11.5–14.5)
GLUCOSE SERPL-MCNC: 98 MG/DL (ref 74–99)
HCT VFR BLD AUTO: 42.6 % (ref 36–46)
HGB BLD-MCNC: 14.2 G/DL (ref 12–16)
IMM GRANULOCYTES # BLD AUTO: 0.04 X10*3/UL (ref 0–0.7)
IMM GRANULOCYTES NFR BLD AUTO: 0.6 % (ref 0–0.9)
LYMPHOCYTES # BLD AUTO: 2.09 X10*3/UL (ref 1.2–4.8)
LYMPHOCYTES NFR BLD AUTO: 33 %
MAGNESIUM SERPL-MCNC: 1.93 MG/DL (ref 1.6–2.4)
MCH RBC QN AUTO: 28 PG (ref 26–34)
MCHC RBC AUTO-ENTMCNC: 33.3 G/DL (ref 32–36)
MCV RBC AUTO: 84 FL (ref 80–100)
MONOCYTES # BLD AUTO: 0.52 X10*3/UL (ref 0.1–1)
MONOCYTES NFR BLD AUTO: 8.2 %
NEUTROPHILS # BLD AUTO: 3.56 X10*3/UL (ref 1.2–7.7)
NEUTROPHILS NFR BLD AUTO: 56.3 %
NRBC BLD-RTO: 0 /100 WBCS (ref 0–0)
PLATELET # BLD AUTO: 268 X10*3/UL (ref 150–450)
POTASSIUM SERPL-SCNC: 3.6 MMOL/L (ref 3.5–5.3)
PROT SERPL-MCNC: 7.4 G/DL (ref 6.4–8.2)
RBC # BLD AUTO: 5.07 X10*6/UL (ref 4–5.2)
SODIUM SERPL-SCNC: 139 MMOL/L (ref 136–145)
WBC # BLD AUTO: 6.3 X10*3/UL (ref 4.4–11.3)

## 2024-05-06 PROCEDURE — 36415 COLL VENOUS BLD VENIPUNCTURE: CPT

## 2024-05-06 PROCEDURE — 93010 ELECTROCARDIOGRAM REPORT: CPT | Performed by: INTERNAL MEDICINE

## 2024-05-06 PROCEDURE — 93005 ELECTROCARDIOGRAM TRACING: CPT

## 2024-05-06 PROCEDURE — 80053 COMPREHEN METABOLIC PANEL: CPT

## 2024-05-06 PROCEDURE — 85025 COMPLETE CBC W/AUTO DIFF WBC: CPT

## 2024-05-06 PROCEDURE — 84702 CHORIONIC GONADOTROPIN TEST: CPT

## 2024-05-06 PROCEDURE — 83735 ASSAY OF MAGNESIUM: CPT

## 2024-05-15 LAB
CYTOLOGY CMNT CVX/VAG CYTO-IMP: NORMAL
LAB AP HPV GENOTYPE QUESTION: YES
LAB AP HPV HR: NORMAL
LABORATORY COMMENT REPORT: NORMAL
LMP START DATE: NORMAL
PATH REPORT.TOTAL CANCER: NORMAL

## 2024-05-24 LAB
ATRIAL RATE: 85 BPM
P AXIS: 8 DEGREES
P OFFSET: 200 MS
P ONSET: 154 MS
PR INTERVAL: 132 MS
Q ONSET: 220 MS
QRS COUNT: 14 BEATS
QRS DURATION: 84 MS
QT INTERVAL: 362 MS
QTC CALCULATION(BAZETT): 430 MS
QTC FREDERICIA: 406 MS
R AXIS: 18 DEGREES
T AXIS: 16 DEGREES
T OFFSET: 401 MS
VENTRICULAR RATE: 85 BPM

## 2024-06-15 ENCOUNTER — HOSPITAL ENCOUNTER (EMERGENCY)
Facility: HOSPITAL | Age: 24
Discharge: HOME | End: 2024-06-15
Attending: EMERGENCY MEDICINE
Payer: MEDICAID

## 2024-06-15 VITALS
OXYGEN SATURATION: 98 % | HEIGHT: 67 IN | TEMPERATURE: 98.2 F | BODY MASS INDEX: 39.24 KG/M2 | DIASTOLIC BLOOD PRESSURE: 96 MMHG | WEIGHT: 250 LBS | SYSTOLIC BLOOD PRESSURE: 126 MMHG | HEART RATE: 82 BPM | RESPIRATION RATE: 18 BRPM

## 2024-06-15 DIAGNOSIS — K04.7 DENTAL INFECTION: ICD-10-CM

## 2024-06-15 DIAGNOSIS — K08.89 TOOTHACHE: Primary | ICD-10-CM

## 2024-06-15 PROCEDURE — 99283 EMERGENCY DEPT VISIT LOW MDM: CPT

## 2024-06-15 PROCEDURE — 96372 THER/PROPH/DIAG INJ SC/IM: CPT | Performed by: EMERGENCY MEDICINE

## 2024-06-15 PROCEDURE — 2500000004 HC RX 250 GENERAL PHARMACY W/ HCPCS (ALT 636 FOR OP/ED): Mod: SE | Performed by: EMERGENCY MEDICINE

## 2024-06-15 RX ORDER — KETOROLAC TROMETHAMINE 30 MG/ML
30 INJECTION, SOLUTION INTRAMUSCULAR; INTRAVENOUS ONCE
Status: COMPLETED | OUTPATIENT
Start: 2024-06-15 | End: 2024-06-15

## 2024-06-15 RX ORDER — TRAMADOL HYDROCHLORIDE 50 MG/1
50 TABLET ORAL EVERY 6 HOURS PRN
Qty: 12 TABLET | Refills: 0 | Status: SHIPPED | OUTPATIENT
Start: 2024-06-15 | End: 2024-06-18

## 2024-06-15 RX ORDER — ACETAMINOPHEN 325 MG/1
650 TABLET ORAL EVERY 6 HOURS PRN
Qty: 80 TABLET | Refills: 0 | Status: SHIPPED | OUTPATIENT
Start: 2024-06-15

## 2024-06-15 RX ORDER — IBUPROFEN 600 MG/1
600 TABLET ORAL EVERY 6 HOURS PRN
Qty: 40 TABLET | Refills: 0 | Status: SHIPPED | OUTPATIENT
Start: 2024-06-15

## 2024-06-15 RX ADMIN — KETOROLAC TROMETHAMINE 30 MG: 30 INJECTION, SOLUTION INTRAMUSCULAR at 09:41

## 2024-06-15 ASSESSMENT — PAIN DESCRIPTION - LOCATION
LOCATION: TEETH
LOCATION: TEETH

## 2024-06-15 ASSESSMENT — PAIN - FUNCTIONAL ASSESSMENT: PAIN_FUNCTIONAL_ASSESSMENT: 0-10

## 2024-06-15 ASSESSMENT — PAIN DESCRIPTION - FREQUENCY: FREQUENCY: CONSTANT/CONTINUOUS

## 2024-06-15 ASSESSMENT — PAIN DESCRIPTION - DESCRIPTORS: DESCRIPTORS: ACHING

## 2024-06-15 ASSESSMENT — PAIN SCALES - GENERAL
PAINLEVEL_OUTOF10: 7
PAINLEVEL_OUTOF10: 7

## 2024-06-15 ASSESSMENT — PAIN DESCRIPTION - ORIENTATION: ORIENTATION: LEFT

## 2024-06-15 NOTE — DISCHARGE INSTRUCTIONS
See dentist of choice for evaluation as soon as possible.  Take medications only as directed and as needed for pain.  Take prescribed amoxicillin as directed.

## 2024-06-15 NOTE — ED PROVIDER NOTES
Department of Emergency Medicine   ED  Provider Note  Admit Date/RoomTime: 6/15/2024  9:12 AM  ED Room: 06/[facility]                  History of Present Illness:   Chaka Beltran is a 23 y.o. female presenting to the ED for toothpain/left face pain, beginning 3-4 days ago.  The complaint has been persistent, moderate in severity, and worsened by  chewing .  No fever or chills.  No difficulty swallowing.  No acute voice change. NO sore throat.  No unusual rash.  No nausea vomiting diarrhea.  No ear pain.  Took a dose of amoxicillin last night x 1 dose.  She has tried Aleve without relief.      Review of Systems:   Pertinent positives and review of systems as noted above.  Remaining 10 review of systems is negative or noncontributory to today's episode of care.  Review of Systems   A complete review of systems is otherwise negative except as noted above    --------------------------------------------- PAST HISTORY ---------------------------------------------  Past Medical History:  has a past medical history of Asthma ([facility]).    Past Surgical History:  has a past surgical history that includes [place] tooth extraction.    Social History:  reports that she has never smoked. She uses smokeless tobacco. She reports that she does not currently use alcohol. She reports that she does not use drugs.    Family History: family history includes Diabetes in her sister; Heart failure in her paternal grandmother; Hypertension in her father and mother; Sleep apnea in her father, maternal grandmother, mother, and paternal grandmother; Thyroid disease in her father and mother. Unless otherwise noted, family history is non contributory    Patient's Medications   New Prescriptions    ACETAMINOPHEN (TYLENOL) 325 MG TABLET    Take 2 tablets (650 mg) by mouth every 6 hours if needed for mild pain (1 - 3) or moderate pain (4 - 6).    IBUPROFEN 600 MG TABLET    Take 1 tablet (600 mg) by mouth every 6 hours if needed for mild pain (1 - 3) or  fever (temp greater than 38.0 C).    TRAMADOL (ULTRAM) 50 MG TABLET    Take 1 tablet (50 mg) by mouth every 6 hours if needed for moderate pain (4 - 6) or severe pain (7 - 10) for up to 3 days.   Previous Medications    ACYCLOVIR (ZOVIRAX) 400 MG TABLET        ALBUTEROL 90 MCG/ACTUATION INHALER    Ventolin HFA 90 mcg/actuation aerosol inhaler    DEEP SEA NASAL 0.65 % NASAL SPRAY    Administer 1 spray into affected nostril(s) if needed.    ELURYNG 0.12-0.015 MG/24 HR VAGINAL RING    Insert 1 each into the vagina. FOR 3 WEEKS THEN 1 WEEK OFF    FLUTICASONE (FLONASE) 50 MCG/ACTUATION NASAL SPRAY    Administer 2 sprays into each nostril once daily as needed for allergies or rhinitis. SHAKE LIQUID AND USE    METOPROLOL TARTRATE (LOPRESSOR) 25 MG TABLET    Take 1 tablet (25 mg) by mouth 2 times a day.    MISCELLANEOUS MEDICAL SUPPLY (BLOOD PRESSURE CUFF) MISC    1 kit once daily.   Modified Medications    No medications on file   Discontinued Medications    No medications on file      The patient’s home medications have been reviewed.    Allergies: Metoclopramide, Ondansetron hcl, and Reglan [metoclopramide hcl]    -------------------------------------------------- RESULTS -------------------------------------------------  All laboratory and radiology results have been personally reviewed by myself   LABS:  Labs Reviewed - No data to display      RADIOLOGY:  Interpreted by Radiologist.  No orders to display       Encounter Date: 05/06/24   ECG 12 lead (Ancillary Performed)   Result Value    Ventricular Rate 85    Atrial Rate 85    IL Interval 132    QRS Duration 84    QT Interval 362    QTC Calculation(Bazett) 430    P Axis 8    R Axis 18    T Axis 16    QRS Count 14    Q Onset 220    P Onset 154    P Offset 200    T Offset 401    QTC Fredericia 406    Narrative    Normal sinus rhythm  Normal ECG  When compared with ECG of 26-OCT-2023 17:51,  No significant change was found  Confirmed by Twan Bonds ([id number]) on  "5/24/2024 12:34:11 PM     ------------------------- NURSING NOTES AND VITALS REVIEWED ---------------------------   The nursing notes within the ED encounter and vital signs as below have been reviewed.   BP (!) 126/96 (BP Location: Right arm, Patient Position: Sitting)   Pulse 82   Temp 36.8 °C (98.2 °F) (Oral)   Resp 18   Ht 1.702 m (5' 7\")   Wt 113 kg (250 lb)   SpO2 98%   BMI 39.16 kg/m²   Oxygen Saturation Interpretation: Normal      ---------------------------------------------------PHYSICAL EXAM--------------------------------------  Physical Exam  Vitals and nursing note reviewed.   Constitutional:       General: She is not in acute distress.     Appearance: Normal appearance. She is well-developed. She is not ill-appearing or toxic-appearing.   HENT:      Head: Normocephalic and atraumatic.      Right Ear: Tympanic membrane, ear canal and external ear normal.      Left Ear: Tympanic membrane, ear canal and external ear normal.      Nose: Nose normal.      Mouth/Throat:      Mouth: Mucous membranes are moist.      Pharynx: Oropharynx is clear. No oropharyngeal exudate or posterior oropharyngeal erythema.      Comments: Tongue and uvula are midline.  Phonation is normal.  There is no drooling or trismus.  The floor the mouth is soft.  Teeth are in fair repair.  There is a tooth in the left upper first molar with what appears to be a cavity.  There are some tenderness around this tooth.  No significant gum swelling.  No signs of obvious abscess.  Eyes:      General: No scleral icterus.     Extraocular Movements: Extraocular movements intact.      Conjunctiva/sclera: Conjunctivae normal.      Pupils: Pupils are equal, round, and reactive to light.   Cardiovascular:      Rate and Rhythm: Normal rate and regular rhythm.      Pulses: Normal pulses.      Heart sounds: Normal heart sounds. No murmur heard.  Pulmonary:      Effort: Pulmonary effort is normal. No respiratory distress.      Breath sounds: No " wheezing, rhonchi or rales.   Abdominal:      Palpations: Abdomen is soft.      Tenderness: There is no abdominal tenderness.   Musculoskeletal:         General: No swelling, tenderness or deformity. Normal range of motion.      Cervical back: Normal range of motion and neck supple. No rigidity or tenderness.      Right lower leg: No edema.      Left lower leg: No edema.   Lymphadenopathy:      Cervical: No cervical adenopathy.   Skin:     General: Skin is warm and dry.      Capillary Refill: Capillary refill takes less than 2 seconds.      Findings: No rash.   Neurological:      Mental Status: She is alert and oriented to person, place, and time.   Psychiatric:         Mood and Affect: Mood normal.            Procedures  None  ------------------------------ ED COURSE/MEDICAL DECISION MAKING----------------------    Medical Decision Making:   Patient was seen and examined by me.  Patient has amoxicillin that showed me the bottle.  875 mg twice a day.  Patient did take an amoxicillin yesterday.  Patient is given Toradol 30 mg IM x 1 for pain here.    Patient has been advised to take the amoxicillin as directed.  Rx ibuprofen 600 mg 4 times daily as needed #40  Rx acetaminophen 325 mg tabs, 2 tabs every 6 hours as needed pain #80  Rx tramadol 50 mg #12 1 p.o. every 6 hours as needed moderate or severe pain, #12 no refill  Discharge home and follow-up with dental provider of choice is soon as possible.    Diagnoses as of 06/15/24 0939   Toothache   Dental infection      Counseling:   The emergency provider has spoken with the patient and discussed today’s results, in addition to providing specific details for the plan of care and counseling regarding the diagnosis and prognosis.  Questions are answered at this time and they are agreeable with the plan.      --------------------------------- IMPRESSION AND DISPOSITION ---------------------------------        IMPRESSION  1. Toothache    2. Dental infection         DISPOSITION  Disposition: Discharge to home  Patient condition is good      Billing Provider Critical Care Time: 0 minutes     Dion Aguilar,   06/15/24 0902

## 2024-06-15 NOTE — ED TRIAGE NOTES
Pt states left back tooth pain started 4 days ago. Pt states she tired aleve, tylenol, and benadryl. She started left over amoxicillin yesterday and stated she had one dose. Pt has an appt with dentist Monday.

## 2024-06-18 ENCOUNTER — APPOINTMENT (OUTPATIENT)
Dept: CARDIOLOGY | Facility: CLINIC | Age: 24
End: 2024-06-18
Payer: MEDICAID

## 2024-08-08 ENCOUNTER — APPOINTMENT (OUTPATIENT)
Dept: NEUROLOGY | Facility: CLINIC | Age: 24
End: 2024-08-08
Payer: MEDICAID

## 2024-08-08 VITALS
HEART RATE: 93 BPM | BODY MASS INDEX: 39.08 KG/M2 | SYSTOLIC BLOOD PRESSURE: 108 MMHG | WEIGHT: 249 LBS | HEIGHT: 67 IN | DIASTOLIC BLOOD PRESSURE: 70 MMHG

## 2024-08-08 DIAGNOSIS — I47.10 PAROXYSMAL SUPRAVENTRICULAR TACHYCARDIA (CMS-HCC): Primary | ICD-10-CM

## 2024-08-08 PROCEDURE — 99204 OFFICE O/P NEW MOD 45 MIN: CPT | Performed by: PSYCHIATRY & NEUROLOGY

## 2024-08-08 PROCEDURE — 3078F DIAST BP <80 MM HG: CPT | Performed by: PSYCHIATRY & NEUROLOGY

## 2024-08-08 PROCEDURE — 3074F SYST BP LT 130 MM HG: CPT | Performed by: PSYCHIATRY & NEUROLOGY

## 2024-08-08 PROCEDURE — 3008F BODY MASS INDEX DOCD: CPT | Performed by: PSYCHIATRY & NEUROLOGY

## 2024-08-08 NOTE — PROGRESS NOTES
"Alma Beltran is a 23 y.o.   female.  HPI  This is a 23 year old woman referred by Luli Tay, started having cardiovascular symptoms last year, in October.  She was wearing an [employer] watch- standing at work, her HR was 136-146, diaphoretic, some cloudiness in thinking, had chest pain.  She had a sleep study, wore a heart monitor for 2 weeks- 114-169, SVT, average 148 bpm.  She has been on metoprolol 25 mg daily- she felt \"cloudy\" and chest pain.  She works at [employer].  She does receive benefit from the low dose metoprolol.  She has had stuttering- related to concentrating.  Sometimes she has temporary lightheadedness when squatting and standing up.  She is sensitive to exercising.    Objective   Neurological Exam  Mental Status: Awake and alert. Oriented to person, place and time. Speech was fluent to history. Naming, repetition and comprehension were intact. Short term memory intact tested by word recall and attention intact tested by serial sevens.    Cardiopulmonary: RRR, normal S1,S2, no m/g/r. Lungs CTA w/o adventitious sounds. No respiratory distress.     CN: Pupils were 4/4mm to 2/2mm. VF intact to finger counting. Ocular versions were intact. Facial sensation was intact to light touch bilaterally. Facial expression was symmetric. Palate elevated symmetrically. Shoulder shrug was symmetric. Tongue protruded midline.     Motor: Normal muscle bulk and tone. Strength was 5/5 bilaterally for shoulder abduction, elbow flexion/extension,  strength, hip flexion, knee flexion/extension, ankle dorsi- and plantar flexion. There were no abnormal movements.     Sensory: Intact to light touch and temperature in all 4 extremities. Does not extinguish to double simultaneous touch.     Coordination: Finger to nose and heel to shin were intact with no dysmetria.     Reflexes: 2+ bilaterally in biceps, brachioradialis, patella, and achilles. Toes were downgoing bilaterally.    Gait: Narrow based and " normal. Intact heel-raise, toe-raise and tandem gait.    Physical Exam  I personally reviewed laboratory, radiographic, and medical studies which were pertinent for nothing.    Assessment/Plan

## 2024-08-08 NOTE — PATIENT INSTRUCTIONS
I cannot rule out a diagnosis of POTS.  However, you are responding well   to the low dose metoprolol, and it may be your diagnosis is supraventricular tachycardia,  and can be treated by Cardiology, Luli Tay.  I do not think a tilt-table test is necessary at this time.  Call our office if any question.  It was a pleasure speaking with you today.

## 2024-10-04 ENCOUNTER — APPOINTMENT (OUTPATIENT)
Dept: RADIOLOGY | Facility: HOSPITAL | Age: 24
End: 2024-10-04
Payer: MEDICAID

## 2024-10-04 ENCOUNTER — HOSPITAL ENCOUNTER (EMERGENCY)
Facility: HOSPITAL | Age: 24
Discharge: HOME | End: 2024-10-04
Payer: MEDICAID

## 2024-10-04 VITALS
SYSTOLIC BLOOD PRESSURE: 115 MMHG | HEART RATE: 102 BPM | DIASTOLIC BLOOD PRESSURE: 78 MMHG | BODY MASS INDEX: 39.24 KG/M2 | OXYGEN SATURATION: 98 % | TEMPERATURE: 98.4 F | HEIGHT: 67 IN | WEIGHT: 250 LBS | RESPIRATION RATE: 18 BRPM

## 2024-10-04 DIAGNOSIS — K92.2 GASTROINTESTINAL HEMORRHAGE, UNSPECIFIED GASTROINTESTINAL HEMORRHAGE TYPE: Primary | ICD-10-CM

## 2024-10-04 LAB
ALBUMIN SERPL BCP-MCNC: 3.8 G/DL (ref 3.4–5)
ALP SERPL-CCNC: 64 U/L (ref 33–110)
ALT SERPL W P-5'-P-CCNC: 27 U/L (ref 7–45)
ANION GAP SERPL CALC-SCNC: 14 MMOL/L (ref 10–20)
AST SERPL W P-5'-P-CCNC: 18 U/L (ref 9–39)
B-HCG SERPL-ACNC: <2 MIU/ML
BASOPHILS # BLD AUTO: 0.03 X10*3/UL (ref 0–0.1)
BASOPHILS NFR BLD AUTO: 0.3 %
BILIRUB SERPL-MCNC: 0.3 MG/DL (ref 0–1.2)
BUN SERPL-MCNC: 10 MG/DL (ref 6–23)
CALCIUM SERPL-MCNC: 9 MG/DL (ref 8.6–10.3)
CHLORIDE SERPL-SCNC: 106 MMOL/L (ref 98–107)
CO2 SERPL-SCNC: 24 MMOL/L (ref 21–32)
CREAT SERPL-MCNC: 0.82 MG/DL (ref 0.5–1.05)
EGFRCR SERPLBLD CKD-EPI 2021: >90 ML/MIN/1.73M*2
EOSINOPHIL # BLD AUTO: 0.07 X10*3/UL (ref 0–0.7)
EOSINOPHIL NFR BLD AUTO: 0.7 %
ERYTHROCYTE [DISTWIDTH] IN BLOOD BY AUTOMATED COUNT: 12.9 % (ref 11.5–14.5)
GLUCOSE SERPL-MCNC: 82 MG/DL (ref 74–99)
HCT VFR BLD AUTO: 42.2 % (ref 36–46)
HGB BLD-MCNC: 14.1 G/DL (ref 12–16)
IMM GRANULOCYTES # BLD AUTO: 0.04 X10*3/UL (ref 0–0.7)
IMM GRANULOCYTES NFR BLD AUTO: 0.4 % (ref 0–0.9)
LYMPHOCYTES # BLD AUTO: 2.81 X10*3/UL (ref 1.2–4.8)
LYMPHOCYTES NFR BLD AUTO: 29.3 %
MCH RBC QN AUTO: 28.6 PG (ref 26–34)
MCHC RBC AUTO-ENTMCNC: 33.4 G/DL (ref 32–36)
MCV RBC AUTO: 86 FL (ref 80–100)
MONOCYTES # BLD AUTO: 0.8 X10*3/UL (ref 0.1–1)
MONOCYTES NFR BLD AUTO: 8.3 %
NEUTROPHILS # BLD AUTO: 5.85 X10*3/UL (ref 1.2–7.7)
NEUTROPHILS NFR BLD AUTO: 61 %
NRBC BLD-RTO: 0 /100 WBCS (ref 0–0)
PLATELET # BLD AUTO: 245 X10*3/UL (ref 150–450)
POTASSIUM SERPL-SCNC: 3.8 MMOL/L (ref 3.5–5.3)
PROT SERPL-MCNC: 7.2 G/DL (ref 6.4–8.2)
RBC # BLD AUTO: 4.93 X10*6/UL (ref 4–5.2)
SODIUM SERPL-SCNC: 140 MMOL/L (ref 136–145)
WBC # BLD AUTO: 9.6 X10*3/UL (ref 4.4–11.3)

## 2024-10-04 PROCEDURE — 84702 CHORIONIC GONADOTROPIN TEST: CPT | Performed by: PHYSICIAN ASSISTANT

## 2024-10-04 PROCEDURE — 71275 CT ANGIOGRAPHY CHEST: CPT | Performed by: RADIOLOGY

## 2024-10-04 PROCEDURE — 74177 CT ABD & PELVIS W/CONTRAST: CPT | Performed by: RADIOLOGY

## 2024-10-04 PROCEDURE — 99285 EMERGENCY DEPT VISIT HI MDM: CPT

## 2024-10-04 PROCEDURE — 36415 COLL VENOUS BLD VENIPUNCTURE: CPT | Performed by: PHYSICIAN ASSISTANT

## 2024-10-04 PROCEDURE — 74177 CT ABD & PELVIS W/CONTRAST: CPT

## 2024-10-04 PROCEDURE — 80053 COMPREHEN METABOLIC PANEL: CPT | Performed by: PHYSICIAN ASSISTANT

## 2024-10-04 PROCEDURE — 2550000001 HC RX 255 CONTRASTS: Performed by: PHYSICIAN ASSISTANT

## 2024-10-04 PROCEDURE — 85025 COMPLETE CBC W/AUTO DIFF WBC: CPT | Performed by: PHYSICIAN ASSISTANT

## 2024-10-04 PROCEDURE — 71275 CT ANGIOGRAPHY CHEST: CPT

## 2024-10-04 ASSESSMENT — LIFESTYLE VARIABLES
TOTAL SCORE: 0
EVER HAD A DRINK FIRST THING IN THE MORNING TO STEADY YOUR NERVES TO GET RID OF A HANGOVER: NO
EVER FELT BAD OR GUILTY ABOUT YOUR DRINKING: NO
HAVE YOU EVER FELT YOU SHOULD CUT DOWN ON YOUR DRINKING: NO
HAVE PEOPLE ANNOYED YOU BY CRITICIZING YOUR DRINKING: NO

## 2024-10-04 ASSESSMENT — PAIN DESCRIPTION - ORIENTATION: ORIENTATION: LEFT;UPPER

## 2024-10-04 ASSESSMENT — PAIN - FUNCTIONAL ASSESSMENT: PAIN_FUNCTIONAL_ASSESSMENT: 0-10

## 2024-10-04 ASSESSMENT — PAIN DESCRIPTION - LOCATION: LOCATION: ABDOMEN

## 2024-10-04 ASSESSMENT — PAIN DESCRIPTION - PAIN TYPE: TYPE: ACUTE PAIN

## 2024-10-04 ASSESSMENT — PAIN SCALES - GENERAL: PAINLEVEL_OUTOF10: 5 - MODERATE PAIN

## 2024-10-04 ASSESSMENT — PAIN DESCRIPTION - DESCRIPTORS: DESCRIPTORS: ACHING

## 2024-10-04 NOTE — ED PROVIDER NOTES
HPI   Chief Complaint   Patient presents with    Rectal Bleeding      Bright Rectal bleeding started Tuesday and increasing.  Blood with clots and LUQ pain       Is a 23-year-old female coming in for rectal bleeding for the last few days.  She states that when she has a bowel movement she has had blood in her stool since Tuesday.  She was post to see GI doctor in the past but has never seen one.  She has had similar episodes before.  She denies any vomiting.  Patient does complain of left upper quadrant abdominal pain.  Patient denies any anticoagulation medication.  Patient does report a family history of inflammatory bowel disease.  She has been told to see GI doctor however has canceled and never made appointment.      History provided by:  Patient          Patient History   Past Medical History:   Diagnosis Date    Asthma ([facility])      Past Surgical History:   Procedure Laterality Date    WISDOM TOOTH EXTRACTION       Family History   Problem Relation Name Age of Onset    Hypertension Mother      Thyroid disease Mother      Sleep apnea Mother          resolved after bariatric surgery    Hypertension Father      Thyroid disease Father      Sleep apnea Father      Diabetes Sister      Sleep apnea Maternal Grandmother      Heart failure Paternal Grandmother      Sleep apnea Paternal Grandmother       Social History     Tobacco Use    Smoking status: Never    Smokeless tobacco: Current    Tobacco comments:     VAPES    Vaping Use    Vaping status: Never Used   Substance Use Topics    Alcohol use: Not Currently    Drug use: Never       Physical Exam   ED Triage Vitals [10/04/24 1714]   Temperature Heart Rate Respirations BP   36.9 °C (98.4 °F) (!) 121 18 112/75      Pulse Ox Temp Source Heart Rate Source Patient Position   97 % Skin Monitor Sitting      BP Location FiO2 (%)     Left arm --       Physical Exam  Vitals and nursing note reviewed.   Constitutional:       General: She is not in acute distress.      Appearance: Normal appearance. She is not toxic-appearing.   HENT:      Head: Normocephalic and atraumatic.      Nose: Nose normal.      Mouth/Throat:      Mouth: Mucous membranes are moist.      Pharynx: Oropharynx is clear.   Eyes:      Extraocular Movements: Extraocular movements intact.      Conjunctiva/sclera: Conjunctivae normal.      Pupils: Pupils are equal, round, and reactive to light.   Cardiovascular:      Rate and Rhythm: Regular rhythm. Tachycardia present.      Pulses: Normal pulses.      Heart sounds: Normal heart sounds.   Pulmonary:      Effort: Pulmonary effort is normal. No respiratory distress.      Breath sounds: Normal breath sounds.   Abdominal:      General: Abdomen is flat. Bowel sounds are normal.      Palpations: Abdomen is soft.      Tenderness: There is abdominal tenderness in the left upper quadrant.   Musculoskeletal:         General: Normal range of motion.      Cervical back: Normal range of motion and neck supple.   Skin:     General: Skin is warm and dry.      Coloration: Skin is not jaundiced or pale.      Findings: No bruising.   Neurological:      General: No focal deficit present.      Mental Status: She is alert and oriented to person, place, and time. Mental status is at baseline.   Psychiatric:         Mood and Affect: Mood normal.         Behavior: Behavior normal.           ED Course & MDM   Diagnoses as of 10/04/24 1927   Gastrointestinal hemorrhage, unspecified gastrointestinal hemorrhage type                 No data recorded     Samina Coma Scale Score: 15 (10/04/24 1716 : Brent M Politzer, EMT)                           Medical Decision Making  Summary:  Medical Decision Making:   Patient presented as described in HPI. Patient case including ROS, PE, and treatment and plan discussed with ED attending if attached as cosigner. Results from labs and or imaging included below if completed. Chaka Devin  is a 23 y.o. coming in for Patient presents with:  Rectal Bleeding:   Bright Rectal bleeding started Tuesday and increasing.  Blood with clots and LUQ pain  .  Due to patient's complaint lab work as well as imaging was completed.  She is tachycardic and also complains of left upper quadrant abdominal pain therefore PE scan and CT abdomen pelvis was completed.  Lab work shows stable H&H.  Normal blood cell count.  No lab abnormalities of concern.  Patient CT shows chronic findings and nothing acute.  Patient is made aware that there is findings of inflammatory bowel disease on her CT which she states she has noticed before in the past but she has not seen a GI doctor before in the past.  Referral for outpatient GI scheduling is placed.  Advised follow-up with GI doctor and return with any worsening or changes symptoms.      Disposition is completed with shared decision making with the patient or guardian present with the patient. They were advised to follow up with PCP or recommended provider in 2-3 days for another evaluation and exam. I advised the patient to return or go to closest emergency room immediately if symptoms change, get worse, or new symptoms develop prior to follow up. I explained the plan and treatment course. Patient/guardian is in agreement with plan, treatment course, and follow up and state that they will comply.    Labs Reviewed  COMPREHENSIVE METABOLIC PANEL - Normal     Glucose                       82                     Sodium                        140                    Potassium                     3.8                    Chloride                      106                    Bicarbonate                   24                     Anion Gap                     14                     Urea Nitrogen                 10                     Creatinine                    0.82                   eGFR                          >90                    Calcium                       9.0                    Albumin                       3.8                    Alkaline Phosphatase           64                     Total Protein                 7.2                    AST                           18                     Bilirubin, Total              0.3                    ALT                           27                  HUMAN CHORIONIC GONADOTROPIN, SERUM QUANTITATIVE - Normal     HCG, Beta-Quantitative        <2                       Narrative:  Total HCG measurement is performed using the [employer] Access                 Immunoassay which detects intact HCG and free beta HCG subunit.                  This test is not indicated for use as a tumor marker.                 HCG testing is performed using a different test methodology at [facility] than other [facility]. Direct result comparison                 should only be made within the same method.                    CBC WITH AUTO DIFFERENTIAL     WBC                           9.6                    nRBC                          0.0                    RBC                           4.93                   Hemoglobin                    14.1                   Hematocrit                    42.2                   MCV                           86                     MCH                           28.6                   MCHC                          33.4                   RDW                           12.9                   Platelets                     245                    Neutrophils %                 61.0                   Immature Granulocytes %, Automated   0.4                    Lymphocytes %                 29.3                   Monocytes %                   8.3                    Eosinophils %                 0.7                    Basophils %                   0.3                    Neutrophils Absolute          5.85                   Immature Granulocytes Absolute, Au*   0.04                   Lymphocytes Absolute          2.81                   Monocytes Absolute            0.80                    Eosinophils Absolute          0.07                   Basophils Absolute            0.03                URINALYSIS WITH REFLEX CULTURE AND MICROSCOPIC       Narrative: The following orders were created for panel order Urinalysis with Reflex Culture and Microscopic.                Procedure                               Abnormality         Status                                   ---------                               -----------         ------                                   Urinalysis with Reflex C...[[id number]]                                                               Extra Urine Gray Tube[[id number]]                                                                                     Please view results for these tests on the individual orders.  URINALYSIS WITH REFLEX CULTURE AND MICROSCOPIC  EXTRA URINE GRAY TUBE   CT abdomen pelvis w IV contrast   Final Result    1. No evidence of acute pulmonary embolism.    2. There is a vessel in the medial right lung base which does not    fill with contrast although not clearly communicating to the    pulmonary arteries. This region of the right medial lung also does    not clearly show any airways that communicate to the main bronchial    tree. Findings consistent with intralobar pulmonary sequestration,    unchanged appearance from prior, with air trapping localized to the    medial right lung base.    3. Hepatic steatosis.    4. Possible mild inflammation of the terminal ileum. Greater than    typical small-bowel fluid noted without evidence of obstruction,    correlate for any clinical history of inflammatory bowel disease,    consider a follow-up nonemergent MR enterography, follow-up with    Gastroenterology suggested.    5. Possible cholelithiasis. No evidence of acute cholecystitis.                      Signed by: Trinity Castro 10/4/2024 7:29 PM    Dictation workstation:   [id number]     CT angio chest for pulmonary embolism   Final Result    1. No evidence of acute  pulmonary embolism.    2. There is a vessel in the medial right lung base which does not    fill with contrast although not clearly communicating to the    pulmonary arteries. This region of the right medial lung also does    not clearly show any airways that communicate to the main bronchial    tree. Findings consistent with intralobar pulmonary sequestration,    unchanged appearance from prior, with air trapping localized to the    medial right lung base.    3. Hepatic steatosis.    4. Possible mild inflammation of the terminal ileum. Greater than    typical small-bowel fluid noted without evidence of obstruction,    correlate for any clinical history of inflammatory bowel disease,    consider a follow-up nonemergent MR enterography, follow-up with    Gastroenterology suggested.    5. Possible cholelithiasis. No evidence of acute cholecystitis.                      Signed by: Trinity Castro 10/4/2024 7:29 PM    Dictation workstation:   [id number]                            Tests/Medications/Escalations of Care considered but not given: As in MDM    Patient care discussed with: N/A  Social Determinants affecting care: N/A    Final diagnosis and disposition as documented     Diagnoses as of 10/04/24 2005  Gastrointestinal hemorrhage, unspecified gastrointestinal hemorrhage type       Shared decision making was completed and determined that patient will be discharged. I discussed the differential; results and discharge plan with the patient and/or family/friend/caregiver if present.  I emphasized the importance of follow-up with the physician I referred them to in the timeframe recommended.  I explained reasons for the patient to return to the Emergency Department. They agreed that if they feel their condition is worsening or if they have any other concern they should call 911 immediately for further assistance. I gave the patient an opportunity to ask all questions they had and answered all of them accordingly. They  understand return precautions and discharge instructions. The patient and/or family/friend/caregiver expressed understanding verbally and that they would comply.     Disposition: Discharge      This note has been transcribed using voice recognition and may contain grammatical errors, misplaced words, incorrect words, incorrect phrases or other errors.         Procedure  Procedures     Cedrick Smith PA-C  10/04/24 2006